# Patient Record
Sex: MALE | Race: WHITE | NOT HISPANIC OR LATINO | Employment: FULL TIME | URBAN - METROPOLITAN AREA
[De-identification: names, ages, dates, MRNs, and addresses within clinical notes are randomized per-mention and may not be internally consistent; named-entity substitution may affect disease eponyms.]

---

## 2017-03-31 ENCOUNTER — OFFICE VISIT (OUTPATIENT)
Dept: INTERNAL MEDICINE | Facility: CLINIC | Age: 27
End: 2017-03-31

## 2017-03-31 VITALS
HEART RATE: 71 BPM | DIASTOLIC BLOOD PRESSURE: 72 MMHG | SYSTOLIC BLOOD PRESSURE: 114 MMHG | TEMPERATURE: 99.3 F | HEIGHT: 72 IN | RESPIRATION RATE: 16 BRPM | BODY MASS INDEX: 25.41 KG/M2 | WEIGHT: 187.6 LBS

## 2017-03-31 DIAGNOSIS — R05.9 COUGH: ICD-10-CM

## 2017-03-31 DIAGNOSIS — R09.82 PND (POST-NASAL DRIP): ICD-10-CM

## 2017-03-31 DIAGNOSIS — J01.90 ACUTE SINUSITIS, RECURRENCE NOT SPECIFIED, UNSPECIFIED LOCATION: Primary | ICD-10-CM

## 2017-03-31 PROCEDURE — 99214 OFFICE O/P EST MOD 30 MIN: CPT | Performed by: NURSE PRACTITIONER

## 2017-03-31 RX ORDER — BROMPHENIRAMINE MALEATE, PSEUDOEPHEDRINE HYDROCHLORIDE, AND DEXTROMETHORPHAN HYDROBROMIDE 2; 30; 10 MG/5ML; MG/5ML; MG/5ML
10 SYRUP ORAL 4 TIMES DAILY PRN
Qty: 118 ML | Refills: 0 | Status: SHIPPED | OUTPATIENT
Start: 2017-03-31 | End: 2017-09-01

## 2017-03-31 RX ORDER — AMOXICILLIN AND CLAVULANATE POTASSIUM 875; 125 MG/1; MG/1
1 TABLET, FILM COATED ORAL 2 TIMES DAILY
Qty: 20 TABLET | Refills: 0 | Status: SHIPPED | OUTPATIENT
Start: 2017-03-31 | End: 2017-09-01

## 2017-03-31 NOTE — PROGRESS NOTES
Subjective   Ap Sotomayor is a 26 y.o. male    Chief Complaint   Patient presents with   • Nasal Congestion     congestion in chest, mucus is green and blood tinged.     URI    This is a new problem. The current episode started in the past 7 days (4 days). The problem has been waxing and waning. There has been no fever. Associated symptoms include congestion, coughing, headaches, rhinorrhea, sinus pain, a sore throat and wheezing. Pertinent negatives include no abdominal pain, chest pain, diarrhea, dysuria, ear pain, joint pain, joint swelling, nausea, neck pain, rash, sneezing, swollen glands or vomiting. Associated symptoms comments: Coughing up green mucous, cough keeps awake at night  . He has tried acetaminophen and NSAIDs (mucinex, dayquil, nyquil, ibuprofen) for the symptoms. The treatment provided no relief.        The following portions of the patient's history were reviewed and updated as appropriate: allergies, current medications, past family history, past medical history, past social history, past surgical history and problem list.  No current outpatient prescriptions on file.     Review of Systems   Constitutional: Negative for chills, fatigue and fever.   HENT: Positive for congestion, postnasal drip, rhinorrhea, sinus pressure and sore throat. Negative for ear pain, facial swelling and sneezing.    Respiratory: Positive for cough and wheezing. Negative for chest tightness and shortness of breath.    Cardiovascular: Negative for chest pain.   Gastrointestinal: Negative for abdominal pain, diarrhea, nausea and vomiting.   Endocrine: Negative for cold intolerance and heat intolerance.   Genitourinary: Negative for dysuria.   Musculoskeletal: Negative for arthralgias, joint pain and neck pain.   Skin: Negative for rash.   Neurological: Positive for headaches. Negative for dizziness.       Objective   Physical Exam   Constitutional: He is oriented to person, place, and time. He appears well-developed and  "well-nourished.   HENT:   Head: Normocephalic and atraumatic.   Nose: Mucosal edema and rhinorrhea present. Right sinus exhibits maxillary sinus tenderness and frontal sinus tenderness. Left sinus exhibits maxillary sinus tenderness and frontal sinus tenderness.   Mouth/Throat: Uvula is midline. Posterior oropharyngeal erythema present. No oropharyngeal exudate or posterior oropharyngeal edema.   Eyes: Conjunctivae and EOM are normal. Pupils are equal, round, and reactive to light.   Neck: Normal range of motion.   Cardiovascular: Normal rate, regular rhythm and normal heart sounds.    Pulmonary/Chest: Effort normal and breath sounds normal.   Abdominal: Soft. Bowel sounds are normal.   Musculoskeletal: Normal range of motion.   Neurological: He is alert and oriented to person, place, and time. He has normal reflexes.   Skin: Skin is warm and dry.   Psychiatric: He has a normal mood and affect. His behavior is normal. Judgment and thought content normal.     Vitals:    03/31/17 1144   BP: 114/72   Pulse: 71   Resp: 16   Temp: 99.3 °F (37.4 °C)   TempSrc: Temporal Artery    Weight: 187 lb 9.6 oz (85.1 kg)   Height: 71.9\" (182.6 cm)         Assessment/Plan   Ap was seen today for nasal congestion.    Diagnoses and all orders for this visit:    Acute sinusitis, recurrence not specified, unspecified location  -     amoxicillin-clavulanate (AUGMENTIN) 875-125 MG per tablet; Take 1 tablet by mouth 2 (Two) Times a Day.  -     brompheniramine-pseudoephedrine-DM 30-2-10 MG/5ML syrup; Take 10 mL by mouth 4 (Four) Times a Day As Needed for Congestion or Cough.    PND (post-nasal drip)  -     amoxicillin-clavulanate (AUGMENTIN) 875-125 MG per tablet; Take 1 tablet by mouth 2 (Two) Times a Day.  -     brompheniramine-pseudoephedrine-DM 30-2-10 MG/5ML syrup; Take 10 mL by mouth 4 (Four) Times a Day As Needed for Congestion or Cough.    Cough  -     amoxicillin-clavulanate (AUGMENTIN) 875-125 MG per tablet; Take 1 tablet by " mouth 2 (Two) Times a Day.  -     brompheniramine-pseudoephedrine-DM 30-2-10 MG/5ML syrup; Take 10 mL by mouth 4 (Four) Times a Day As Needed for Congestion or Cough.      Augmentin as directed  Bromfed as directed.  Tylenol/ibuprofen as needed  Increase fluids  RTC PRN or with worsening of sx's         Scribed for LINDSAY Bellamy by LINDSAY Patricio Student. 3/31/2017  12:11 PM    ISalima APRN, personally performed the services described in this documentation as scribed by the above named individual in my presence, and it is both accurate and complete.  3/31/2017  1:16 PM    LINDSAY Coronel

## 2017-09-01 ENCOUNTER — OFFICE VISIT (OUTPATIENT)
Dept: INTERNAL MEDICINE | Facility: CLINIC | Age: 27
End: 2017-09-01

## 2017-09-01 VITALS
HEART RATE: 82 BPM | OXYGEN SATURATION: 98 % | HEIGHT: 72 IN | TEMPERATURE: 98.1 F | BODY MASS INDEX: 24.95 KG/M2 | DIASTOLIC BLOOD PRESSURE: 78 MMHG | SYSTOLIC BLOOD PRESSURE: 110 MMHG | WEIGHT: 184.2 LBS

## 2017-09-01 DIAGNOSIS — H69.83 EUSTACHIAN TUBE DYSFUNCTION, BILATERAL: ICD-10-CM

## 2017-09-01 DIAGNOSIS — J01.90 ACUTE SINUSITIS, RECURRENCE NOT SPECIFIED, UNSPECIFIED LOCATION: Primary | ICD-10-CM

## 2017-09-01 PROCEDURE — 99213 OFFICE O/P EST LOW 20 MIN: CPT | Performed by: NURSE PRACTITIONER

## 2017-09-01 RX ORDER — FLUTICASONE PROPIONATE 50 MCG
2 SPRAY, SUSPENSION (ML) NASAL DAILY
Qty: 1 BOTTLE | Refills: 0 | Status: SHIPPED | OUTPATIENT
Start: 2017-09-01 | End: 2018-07-19

## 2017-09-01 RX ORDER — AMOXICILLIN AND CLAVULANATE POTASSIUM 875; 125 MG/1; MG/1
1 TABLET, FILM COATED ORAL 2 TIMES DAILY
Qty: 14 TABLET | Refills: 0 | Status: SHIPPED | OUTPATIENT
Start: 2017-09-01 | End: 2018-07-19

## 2017-09-01 RX ORDER — BROMPHENIRAMINE MALEATE, PSEUDOEPHEDRINE HYDROCHLORIDE, AND DEXTROMETHORPHAN HYDROBROMIDE 2; 30; 10 MG/5ML; MG/5ML; MG/5ML
10 SYRUP ORAL 4 TIMES DAILY PRN
Qty: 120 ML | Refills: 0 | Status: SHIPPED | OUTPATIENT
Start: 2017-09-01 | End: 2018-07-19

## 2017-09-01 NOTE — PROGRESS NOTES
Subjective   Ap Sotomayor is a 27 y.o. male.   Chief Complaint   Patient presents with   • Nasal Congestion     nasal congestion with bloody mucous and  cough up yellow/clear mucous.  3 days.  Tried mucous relief and cough drops   • Cough       Sinusitis   This is a new problem. The current episode started in the past 7 days. The problem has been gradually worsening since onset. There has been no fever. The pain is mild. Associated symptoms include congestion (bloody mucous ), coughing (productive clear yellow) and sinus pressure. Pertinent negatives include no chills, diaphoresis, ear pain, headaches, hoarse voice, neck pain, shortness of breath, sneezing, sore throat or swollen glands. Treatments tried: mucinex and cough drops. The treatment provided mild relief.           The following portions of the patient's history were reviewed and updated as appropriate: allergies, current medications, past family history, past medical history, past social history, past surgical history and problem list.    Review of Systems   Constitutional: Negative for appetite change, chills, diaphoresis, fatigue and fever.   HENT: Positive for congestion (bloody mucous ), postnasal drip and sinus pressure. Negative for dental problem, ear pain, hoarse voice, rhinorrhea, sneezing, sore throat and trouble swallowing.    Eyes: Negative for pain and itching.   Respiratory: Positive for cough (productive clear yellow). Negative for shortness of breath and wheezing.    Cardiovascular: Negative for chest pain and leg swelling.   Gastrointestinal: Negative for abdominal pain, nausea and vomiting.   Genitourinary: Negative for difficulty urinating.   Musculoskeletal: Negative for neck pain.   Skin: Negative for rash.   Neurological: Negative for headaches.   Psychiatric/Behavioral: Negative for sleep disturbance.       Objective   Physical Exam   Constitutional: He appears well-developed and well-nourished. He does not have a sickly appearance.  No distress.   HENT:   Head: Normocephalic and atraumatic.   Right Ear: External ear normal. Tympanic membrane is not injected, not erythematous, not retracted and not bulging. Tympanic membrane mobility is normal. A middle ear effusion (small serous) is present.   Left Ear: External ear normal. Tympanic membrane is not injected, not erythematous, not retracted and not bulging. Tympanic membrane mobility is normal. A middle ear effusion (mild serous) is present.   Nose: Mucosal edema and rhinorrhea present. Right sinus exhibits maxillary sinus tenderness and frontal sinus tenderness. Left sinus exhibits maxillary sinus tenderness and frontal sinus tenderness.   Mouth/Throat: Uvula is midline, oropharynx is clear and moist and mucous membranes are normal.   Eyes: Conjunctivae are normal.   Cardiovascular: Normal rate, regular rhythm and normal heart sounds.    Pulmonary/Chest: Effort normal and breath sounds normal.   Abdominal: Soft.   Neurological: He is alert.   Skin: Skin is warm and dry. He is not diaphoretic. No erythema.   Nursing note and vitals reviewed.      Assessment/Plan      Ap was seen today for nasal congestion and cough.    Diagnoses and all orders for this visit:    Acute sinusitis, recurrence not specified, unspecified location  -     amoxicillin-clavulanate (AUGMENTIN) 875-125 MG per tablet; Take 1 tablet by mouth 2 (Two) Times a Day.  -     brompheniramine-pseudoephedrine-DM 30-2-10 MG/5ML syrup; Take 10 mL by mouth 4 (Four) Times a Day As Needed for Allergies.    Eustachian tube dysfunction, bilateral  -     fluticasone (FLONASE) 50 MCG/ACT nasal spray; 2 sprays into each nostril Daily.      Meds as directed  Increase fluids  Tylenol and ibuprofen OTC PRN pain/fever  RTC PRN or with worsening of sx's  Plan of care discussed with pt. He verbalized understanding and agreement.

## 2018-07-19 ENCOUNTER — OFFICE VISIT (OUTPATIENT)
Dept: INTERNAL MEDICINE | Facility: CLINIC | Age: 28
End: 2018-07-19

## 2018-07-19 VITALS
SYSTOLIC BLOOD PRESSURE: 110 MMHG | HEART RATE: 92 BPM | TEMPERATURE: 97.8 F | WEIGHT: 175.8 LBS | BODY MASS INDEX: 23.81 KG/M2 | OXYGEN SATURATION: 97 % | HEIGHT: 72 IN | DIASTOLIC BLOOD PRESSURE: 82 MMHG

## 2018-07-19 DIAGNOSIS — R19.7 DIARRHEA, UNSPECIFIED TYPE: ICD-10-CM

## 2018-07-19 DIAGNOSIS — K52.9 GASTROENTERITIS: ICD-10-CM

## 2018-07-19 DIAGNOSIS — Z91.09 ENVIRONMENTAL ALLERGIES: ICD-10-CM

## 2018-07-19 DIAGNOSIS — J02.9 SORE THROAT: Primary | ICD-10-CM

## 2018-07-19 DIAGNOSIS — R11.0 NAUSEA: ICD-10-CM

## 2018-07-19 LAB
EXPIRATION DATE: NORMAL
INTERNAL CONTROL: NORMAL
Lab: NORMAL
S PYO AG THROAT QL: NEGATIVE

## 2018-07-19 PROCEDURE — 87880 STREP A ASSAY W/OPTIC: CPT | Performed by: NURSE PRACTITIONER

## 2018-07-19 PROCEDURE — 99214 OFFICE O/P EST MOD 30 MIN: CPT | Performed by: NURSE PRACTITIONER

## 2018-07-19 RX ORDER — ONDANSETRON 4 MG/1
4 TABLET, ORALLY DISINTEGRATING ORAL EVERY 8 HOURS PRN
Qty: 20 TABLET | Refills: 0 | Status: SHIPPED | OUTPATIENT
Start: 2018-07-19 | End: 2019-11-01

## 2018-07-19 NOTE — PROGRESS NOTES
Subjective   Ap Sotomayor is a 28 y.o. male.   Chief Complaint   Patient presents with   • Nausea/diarrhea     feverish, body aches, nausea, diarrhea, sore throat, headache.  Symptoms started yesterday. Little better today.  Taken cold relief, nyquil and alkaseltzer cold       Nausea   This is a new problem. The current episode started yesterday. The problem occurs intermittently. The problem has been gradually improving. Associated symptoms include abdominal pain (resolved), headaches, myalgias, nausea and a sore throat. Pertinent negatives include no anorexia, arthralgias, change in bowel habit, chest pain, chills, congestion, coughing, diaphoresis, fatigue, fever (felt feverish, but no fever), neck pain, numbness, rash, swollen glands, urinary symptoms, vertigo, visual change, vomiting or weakness. Nothing aggravates the symptoms. He has tried nothing for the symptoms. The treatment provided no relief.   Diarrhea    This is a new problem. The current episode started yesterday. The problem occurs 2 to 4 times per day. The problem has been gradually improving. The stool consistency is described as watery. Associated symptoms include abdominal pain (resolved), headaches and myalgias. Pertinent negatives include no arthralgias, bloating, chills, coughing, fever (felt feverish, but no fever), sweats, URI, vomiting or weight loss. Nothing aggravates the symptoms. There are no known risk factors. He has tried nothing for the symptoms. The treatment provided no relief. There is no history of irritable bowel syndrome or a recent abdominal surgery.   Sore Throat    This is a new problem. The current episode started yesterday. The problem has been rapidly improving. Neither side of throat is experiencing more pain than the other. There has been no fever. The pain is mild. Associated symptoms include abdominal pain (resolved), diarrhea and headaches. Pertinent negatives include no congestion, coughing, ear pain, neck pain,  shortness of breath, stridor, swollen glands, trouble swallowing or vomiting. He has had no exposure to strep or mono. Treatments tried: OTC meds. The treatment provided moderate relief.        The following portions of the patient's history were reviewed and updated as appropriate: allergies, current medications, past family history, past medical history, past social history, past surgical history and problem list.    Review of Systems   Constitutional: Negative for chills, diaphoresis, fatigue, fever (felt feverish, but no fever) and weight loss.   HENT: Positive for rhinorrhea and sore throat. Negative for congestion, ear pain, postnasal drip, sinus pain, sinus pressure, sneezing, trouble swallowing and voice change.    Respiratory: Negative for cough, shortness of breath and stridor.    Cardiovascular: Negative for chest pain.   Gastrointestinal: Positive for abdominal pain (resolved), diarrhea and nausea. Negative for anorexia, bloating, change in bowel habit and vomiting.   Musculoskeletal: Positive for myalgias. Negative for arthralgias and neck pain.   Skin: Negative for rash.   Allergic/Immunologic: Positive for environmental allergies.   Neurological: Positive for headaches. Negative for vertigo, weakness and numbness.       Objective   Physical Exam   Constitutional: He is oriented to person, place, and time. He appears well-developed and well-nourished. No distress.   HENT:   Head: Normocephalic and atraumatic.   Right Ear: External ear normal.   Left Ear: External ear normal.   Nose: Nose normal.   Mouth/Throat: Oropharynx is clear and moist. No oropharyngeal exudate.   Eyes: Conjunctivae are normal. Right eye exhibits no discharge. Left eye exhibits no discharge. No scleral icterus.   Neck: Normal range of motion. Neck supple.   Cardiovascular: Normal rate, regular rhythm and normal heart sounds.    Pulmonary/Chest: Effort normal and breath sounds normal. No respiratory distress.   Abdominal: Soft.  Bowel sounds are normal. He exhibits no distension and no mass. There is no tenderness. There is no rebound and no guarding. No hernia.   Lymphadenopathy:     He has no cervical adenopathy.   Neurological: He is alert and oriented to person, place, and time.   Skin: Skin is warm and dry. No rash noted. He is not diaphoretic. No erythema. No pallor.   Psychiatric: He has a normal mood and affect. His behavior is normal.   Nursing note and vitals reviewed.      Assessment/Plan      Ap was seen today for nausea/diarrhea.    Diagnoses and all orders for this visit:    Sore throat  -     POCT rapid strep A    Diarrhea, unspecified type    Nausea  -     ondansetron ODT (ZOFRAN ODT) 4 MG disintegrating tablet; Take 1 tablet by mouth Every 8 (Eight) Hours As Needed for Nausea or Vomiting.    Gastroenteritis    Environmental allergies        Strep negative.   zofran as needed for nausea.   Clear liquids X 24h and then AAT  OTC claritin  Tylenol or ibuprofen as needed  Return if symptoms worsen or fail to improve. over the next 24-48 hours  Plan of care discussed with pt. They verbalized understanding and agreement.

## 2019-03-06 ENCOUNTER — TELEPHONE (OUTPATIENT)
Dept: INTERNAL MEDICINE | Facility: CLINIC | Age: 29
End: 2019-03-06

## 2019-03-06 RX ORDER — CIPROFLOXACIN HYDROCHLORIDE 3.5 MG/ML
SOLUTION/ DROPS TOPICAL
Qty: 1 EACH | Refills: 0 | Status: SHIPPED | OUTPATIENT
Start: 2019-03-06 | End: 2019-11-01

## 2019-03-06 NOTE — TELEPHONE ENCOUNTER
Yes I will send in drops. If no better over next few days, would get seen.  OK to give work excuse for today

## 2019-03-06 NOTE — TELEPHONE ENCOUNTER
He woke up with his left eye red and gooey.  He has drainage in his left eye.  He wants to know if you can send him in an eye drop.  He also needs a letter for work today.  He cannot go because of his eye.

## 2019-11-01 ENCOUNTER — OFFICE VISIT (OUTPATIENT)
Dept: INTERNAL MEDICINE | Facility: CLINIC | Age: 29
End: 2019-11-01

## 2019-11-01 VITALS
RESPIRATION RATE: 16 BRPM | HEART RATE: 99 BPM | BODY MASS INDEX: 25.98 KG/M2 | DIASTOLIC BLOOD PRESSURE: 60 MMHG | HEIGHT: 72 IN | SYSTOLIC BLOOD PRESSURE: 100 MMHG | OXYGEN SATURATION: 98 % | TEMPERATURE: 98.6 F | WEIGHT: 191.8 LBS

## 2019-11-01 DIAGNOSIS — J06.9 VIRAL UPPER RESPIRATORY TRACT INFECTION: Primary | ICD-10-CM

## 2019-11-01 DIAGNOSIS — R68.89 FLU-LIKE SYMPTOMS: ICD-10-CM

## 2019-11-01 LAB
EXPIRATION DATE: NORMAL
FLUAV AG NPH QL: NEGATIVE
FLUBV AG NPH QL: NEGATIVE
INTERNAL CONTROL: NORMAL
Lab: NORMAL

## 2019-11-01 PROCEDURE — 87804 INFLUENZA ASSAY W/OPTIC: CPT | Performed by: NURSE PRACTITIONER

## 2019-11-01 PROCEDURE — 99214 OFFICE O/P EST MOD 30 MIN: CPT | Performed by: NURSE PRACTITIONER

## 2019-11-01 RX ORDER — BROMPHENIRAMINE MALEATE, PSEUDOEPHEDRINE HYDROCHLORIDE, AND DEXTROMETHORPHAN HYDROBROMIDE 2; 30; 10 MG/5ML; MG/5ML; MG/5ML
10 SYRUP ORAL 4 TIMES DAILY PRN
Qty: 300 ML | Refills: 0 | Status: SHIPPED | OUTPATIENT
Start: 2019-11-01 | End: 2020-12-10

## 2019-11-01 NOTE — PROGRESS NOTES
Subjective   Ap Sotomayor is a 29 y.o. male    Chief Complaint   Patient presents with   • URI     sx' started yesterday with headache and nausea. No he has also developed chest congestion, slight cough, fatigue, body aches and chills. Has been taking OTC Cold and flu meds.      URI    This is a new problem. The current episode started yesterday. The problem has been unchanged. There has been no fever (but has felt chilled). Associated symptoms include congestion, coughing, headaches, joint pain (bodyaches), nausea, a plugged ear sensation and a sore throat. Pertinent negatives include no abdominal pain, chest pain, diarrhea, dysuria, ear pain, joint swelling, neck pain, rash, rhinorrhea, sinus pain, sneezing, swollen glands, vomiting or wheezing. He has tried acetaminophen and NSAIDs for the symptoms. The treatment provided no relief.        The following portions of the patient's history were reviewed and updated as appropriate: allergies, current medications, past family history, past medical history, past social history, past surgical history and problem list.    Current Outpatient Medications:   •  brompheniramine-pseudoephedrine-DM 30-2-10 MG/5ML syrup, Take 10 mL by mouth 4 (Four) Times a Day As Needed for Congestion., Disp: 300 mL, Rfl: 0     Review of Systems   Constitutional: Negative for chills, fatigue and fever.   HENT: Positive for congestion and sore throat. Negative for ear pain, rhinorrhea, sinus pain and sneezing.    Respiratory: Positive for cough. Negative for chest tightness, shortness of breath and wheezing.    Cardiovascular: Negative for chest pain.   Gastrointestinal: Positive for nausea. Negative for abdominal pain, diarrhea and vomiting.   Endocrine: Negative for cold intolerance and heat intolerance.   Genitourinary: Negative for dysuria.   Musculoskeletal: Positive for joint pain (bodyaches). Negative for arthralgias and neck pain.   Skin: Negative for rash.   Neurological: Positive for  "headaches. Negative for dizziness.       Objective   Physical Exam   Constitutional: He is oriented to person, place, and time. He appears well-developed and well-nourished.   HENT:   Head: Normocephalic and atraumatic.   Right Ear: A middle ear effusion is present.   Left Ear: A middle ear effusion is present.   Nose: Right sinus exhibits no maxillary sinus tenderness and no frontal sinus tenderness. Left sinus exhibits no maxillary sinus tenderness and no frontal sinus tenderness.   Mouth/Throat: Posterior oropharyngeal erythema present.   Eyes: Conjunctivae and EOM are normal. Pupils are equal, round, and reactive to light.   Neck: Normal range of motion.   Cardiovascular: Normal rate, regular rhythm and normal heart sounds.   Pulmonary/Chest: Effort normal and breath sounds normal.   Abdominal: Soft. Bowel sounds are normal.   Musculoskeletal: Normal range of motion.   Neurological: He is alert and oriented to person, place, and time. He has normal reflexes.   Skin: Skin is warm and dry.   Psychiatric: He has a normal mood and affect. His behavior is normal. Judgment and thought content normal.     Vitals:    11/01/19 1025   BP: 100/60   Pulse: 99   Resp: 16   Temp: 98.6 °F (37 °C)   TempSrc: Temporal   SpO2: 98%   Weight: 87 kg (191 lb 12.8 oz)   Height: 182.6 cm (71.89\")         Assessment/Plan   Ap was seen today for uri.    Diagnoses and all orders for this visit:    Viral upper respiratory tract infection  -     brompheniramine-pseudoephedrine-DM 30-2-10 MG/5ML syrup; Take 10 mL by mouth 4 (Four) Times a Day As Needed for Congestion.    Flu-like symptoms  -     POC Influenza A / B  -     brompheniramine-pseudoephedrine-DM 30-2-10 MG/5ML syrup; Take 10 mL by mouth 4 (Four) Times a Day As Needed for Congestion.      Rapid flu test was negative  Sx's appear to be viral   Symptomatic tx  Bromfed PRN  Increase fluids  May use tylenol and/or ibuprofen PRN  RTC if sx's worsen or do not improve           "

## 2020-02-20 ENCOUNTER — TELEPHONE (OUTPATIENT)
Dept: INTERNAL MEDICINE | Facility: CLINIC | Age: 30
End: 2020-02-20

## 2020-02-20 NOTE — TELEPHONE ENCOUNTER
Patient stated he has a swollen lower eyelid on his left eye, this was first noticed 2/14/20. Patient stated he has been using warm compresses since he noticed it. Patient stated it has not gotten any better. Patient is requesting a medication be sent in for this issue if possible.     [facility] on [address] [facility] in [place] confirmed     Please call and advise. Patient call back [phone]

## 2020-02-20 NOTE — TELEPHONE ENCOUNTER
Yes would need to be seen. If we don't have openings here, would go to [facility]

## 2020-12-10 ENCOUNTER — OFFICE VISIT (OUTPATIENT)
Dept: INTERNAL MEDICINE | Facility: CLINIC | Age: 30
End: 2020-12-10

## 2020-12-10 VITALS
HEIGHT: 72 IN | WEIGHT: 195.2 LBS | BODY MASS INDEX: 26.44 KG/M2 | TEMPERATURE: 98.6 F | HEART RATE: 74 BPM | DIASTOLIC BLOOD PRESSURE: 70 MMHG | OXYGEN SATURATION: 99 % | SYSTOLIC BLOOD PRESSURE: 118 MMHG

## 2020-12-10 DIAGNOSIS — K42.9 UMBILICAL HERNIA WITHOUT OBSTRUCTION AND WITHOUT GANGRENE: Primary | ICD-10-CM

## 2020-12-10 PROCEDURE — 99213 OFFICE O/P EST LOW 20 MIN: CPT | Performed by: INTERNAL MEDICINE

## 2020-12-10 NOTE — PROGRESS NOTES
"Subjective   Ap Sotomayor is a 30 y.o. male.     History of Present Illness     Has an umbilical hernia for at least 8 years.  He did see surgeon previously but at that time decided not to have anything done but is noticing more tenderness over the area and would like to go ahead and have it repaired.  No problems with bowel movements generally.  No severe pain but is tender to the touch    Current Outpatient Medications on File Prior to Visit   Medication Sig Dispense Refill   • brompheniramine-pseudoephedrine-DM 30-2-10 MG/5ML syrup Take 10 mL by mouth 4 (Four) Times a Day As Needed for Congestion. 300 mL 0     No current facility-administered medications on file prior to visit.        The following portions of the patient's history were reviewed and updated as appropriate: allergies, current medications, past family history, past medical history, past social history, past surgical history and problem list.    Review of Systems   Constitutional: Positive for unexpected weight gain. Negative for activity change, appetite change, fever and unexpected weight loss.   Gastrointestinal: Positive for abdominal pain. Negative for constipation and diarrhea.   Allergic/Immunologic: Negative for immunocompromised state.       Objective   /70 (BP Location: Left arm, Patient Position: Sitting)   Pulse 74   Temp 98.6 °F (37 °C) (Infrared)   Ht 182.6 cm (71.9\")   Wt 88.5 kg (195 lb 3.2 oz)   SpO2 99%   BMI 26.55 kg/m²   Physical Exam  Constitutional:       General: He is not in acute distress.     Appearance: Normal appearance. He is well-developed. He is not diaphoretic.   HENT:      Head: Normocephalic and atraumatic.   Eyes:      Conjunctiva/sclera: Conjunctivae normal.   Cardiovascular:      Rate and Rhythm: Normal rate and regular rhythm.      Heart sounds: Normal heart sounds. No murmur. No friction rub. No gallop.    Pulmonary:      Effort: Pulmonary effort is normal. No respiratory distress.      Breath " sounds: Normal breath sounds. No wheezing.   Abdominal:      General: Abdomen is flat. Bowel sounds are normal.      Palpations: Abdomen is soft.      Tenderness: There is abdominal tenderness (over umbilicus).      Hernia: A hernia (umbilical) is present.   Skin:     General: Skin is warm and dry.   Neurological:      Mental Status: He is alert and oriented to person, place, and time.   Psychiatric:         Behavior: Behavior normal.         Thought Content: Thought content normal.         Judgment: Judgment normal.           Assessment/Plan   Diagnoses and all orders for this visit:    1. Umbilical hernia without obstruction and without gangrene (Primary)  -     Ambulatory Referral to General Surgery

## 2021-01-15 ENCOUNTER — APPOINTMENT (OUTPATIENT)
Dept: PREADMISSION TESTING | Facility: HOSPITAL | Age: 31
End: 2021-01-15

## 2021-01-15 PROCEDURE — U0004 COV-19 TEST NON-CDC HGH THRU: HCPCS

## 2021-01-15 PROCEDURE — C9803 HOPD COVID-19 SPEC COLLECT: HCPCS

## 2021-01-16 LAB — SARS-COV-2 RNA RESP QL NAA+PROBE: NOT DETECTED

## 2021-01-18 ENCOUNTER — LAB REQUISITION (OUTPATIENT)
Dept: LAB | Facility: HOSPITAL | Age: 31
End: 2021-01-18

## 2021-01-18 DIAGNOSIS — K42.9 UMBILICAL HERNIA WITHOUT OBSTRUCTION OR GANGRENE: ICD-10-CM

## 2021-01-18 PROCEDURE — 88302 TISSUE EXAM BY PATHOLOGIST: CPT | Performed by: SURGERY

## 2021-01-19 LAB
CYTO UR: NORMAL
LAB AP CASE REPORT: NORMAL
LAB AP CLINICAL INFORMATION: NORMAL
PATH REPORT.FINAL DX SPEC: NORMAL
PATH REPORT.GROSS SPEC: NORMAL

## 2021-04-15 ENCOUNTER — OFFICE VISIT (OUTPATIENT)
Dept: INTERNAL MEDICINE | Facility: CLINIC | Age: 31
End: 2021-04-15

## 2021-04-15 VITALS
HEART RATE: 65 BPM | DIASTOLIC BLOOD PRESSURE: 78 MMHG | HEIGHT: 72 IN | TEMPERATURE: 97.4 F | BODY MASS INDEX: 25.73 KG/M2 | WEIGHT: 190 LBS | RESPIRATION RATE: 20 BRPM | SYSTOLIC BLOOD PRESSURE: 120 MMHG | OXYGEN SATURATION: 99 %

## 2021-04-15 DIAGNOSIS — J02.9 SORE THROAT: ICD-10-CM

## 2021-04-15 DIAGNOSIS — K12.0 CANKER SORE: Primary | ICD-10-CM

## 2021-04-15 DIAGNOSIS — J30.9 ALLERGIC RHINITIS, UNSPECIFIED SEASONALITY, UNSPECIFIED TRIGGER: ICD-10-CM

## 2021-04-15 LAB
EXPIRATION DATE: NORMAL
INTERNAL CONTROL: NORMAL
Lab: NORMAL
S PYO AG THROAT QL: NEGATIVE

## 2021-04-15 PROCEDURE — 99213 OFFICE O/P EST LOW 20 MIN: CPT | Performed by: PHYSICIAN ASSISTANT

## 2021-04-15 PROCEDURE — 87880 STREP A ASSAY W/OPTIC: CPT | Performed by: PHYSICIAN ASSISTANT

## 2021-04-15 RX ORDER — LORATADINE 10 MG/1
10 TABLET ORAL DAILY
COMMUNITY
End: 2022-12-28

## 2021-04-15 NOTE — PROGRESS NOTES
Chief Complaint  Sore Throat, Fever, and Earache    Subjective          History of Present Illness  Ap Sotomayor presents to [facility] for   Sore throat:  Has had a sore throat now for the last 3 or 4 days.  Symptoms are persisting and so he wants to make sure he does not have strep.  He is around his nieces and nephews and although they have not been sick they could be carrying strep.  He has not had a fever.  Has not had a cough, headache, sore throat, nausea, vomiting, diarrhea.  Mostly just has a sore throat that is worse on the right and an earache that is worse on the right.  Not feel like he has much runny or stuffy nose.      Review of Systems   Constitutional: Negative for fever and unexpected weight loss.   HENT: Positive for sore throat. Negative for congestion, ear pain, postnasal drip, sinus pressure and swollen glands.    Respiratory: Negative for cough, shortness of breath and wheezing.    Cardiovascular: Negative for chest pain and palpitations.   Gastrointestinal: Negative for constipation, diarrhea, nausea and vomiting.   Musculoskeletal: Negative for myalgias.   Neurological: Negative for headache.       The following portions of the patient's history were reviewed and updated as appropriate: allergies, current medications, past family history, past medical history, past social history, past surgical history and problem list.  No Known Allergies  Current Outpatient Medications on File Prior to Visit   Medication Sig Dispense Refill   • loratadine (CLARITIN) 10 MG tablet Take 10 mg by mouth Daily.     • [DISCONTINUED] oxyCODONE-acetaminophen (PERCOCET) 5-325 MG per tablet Take 1 tablet by mouth Every 6 (Six) Hours As Needed. 12 tablet 0     No current facility-administered medications on file prior to visit.     No orders of the defined types were placed in this encounter.      Social History     Tobacco Use   Smoking Status Never Smoker   Smokeless Tobacco  "Never Used        Objective   Vital Signs:   Vitals:    04/15/21 1509 04/15/21 1519   BP: 120/78    Pulse: 65    Resp: 20    Temp: 100 °F (37.8 °C) 97.4 °F (36.3 °C)   TempSrc: Temporal Temporal   SpO2: 99%    Weight: 86.2 kg (190 lb)    Height: 182.6 cm (71.9\")    PainSc:   4    PainLoc: Throat  Comment: sore throat and ear ache       Physical Exam  Vitals reviewed.   Constitutional:       General: He is not in acute distress.     Appearance: Normal appearance.   HENT:      Head: Normocephalic and atraumatic.      Right Ear: Tympanic membrane, ear canal and external ear normal.      Left Ear: Tympanic membrane, ear canal and external ear normal.      Nose: Nose normal.      Mouth/Throat:      Mouth: Mucous membranes are moist.      Pharynx: Posterior oropharyngeal erythema (4 mm aphthous ulcer on right side of soft palate.) present. No oropharyngeal exudate.   Eyes:      General: No scleral icterus.     Extraocular Movements: Extraocular movements intact.      Conjunctiva/sclera: Conjunctivae normal.   Cardiovascular:      Rate and Rhythm: Normal rate and regular rhythm.      Heart sounds: Normal heart sounds. No murmur heard.     Pulmonary:      Effort: Pulmonary effort is normal. No respiratory distress.      Breath sounds: Normal breath sounds. No stridor. No wheezing or rhonchi.   Musculoskeletal:      Cervical back: Normal range of motion and neck supple.   Skin:     General: Skin is warm and dry.      Coloration: Skin is not jaundiced.   Neurological:      General: No focal deficit present.      Mental Status: He is alert and oriented to person, place, and time.      Gait: Gait normal.   Psychiatric:         Mood and Affect: Mood normal.         Behavior: Behavior normal.        Results for orders placed or performed in visit on 04/15/21   POC Rapid Strep A    Specimen: Swab   Result Value Ref Range    Rapid Strep A Screen Negative Negative, VALID, INVALID, Not Performed    Internal Control Passed Passed    " Lot Number [id number]     Expiration Date 06/30/2021        Result Review :                   Assessment and Plan    Diagnoses and all orders for this visit:    1. Canker sore (Primary)  Assessment & Plan:  Likely cause of sore throat and referred ear pain.  Strep is negative.  Can use Chloraseptic spray as needed for pain.  Follow-up if symptoms worsen or has new lesions in throat.       2. Sore throat  -     POC Rapid Strep A    3. Allergic rhinitis, unspecified seasonality, unspecified trigger  Assessment & Plan:  Continue Claritin          Follow Up   Return for Next scheduled follow up.    Follow up if symptoms worsen or persist or has new or concerning symptoms, go to ER for severe symptoms.   Reviewed common medication effects and side effects and advised to report side effects immediately, the patient expressed good understanding.  Encouraged medication compliance and the importance of keeping scheduled follow up appointments with me and any other providers  If labs or images are ordered we will contact you with the results within the next week.  If you have not heard from us after a week please call our office to inquire about the results.   Patient was given instructions and counseling regarding his condition or for health maintenance advice. Please see specific information pulled into the AVS if appropriate.     Janette Hampton PA-C    * Please note that portions of this note may have been completed with a voice recognition program. Efforts were made to edit the dictation but occasionally words are erroneously transcribed.

## 2021-04-16 NOTE — ASSESSMENT & PLAN NOTE
Likely cause of sore throat and referred ear pain.  Strep is negative.  Can use Chloraseptic spray as needed for pain.  Follow-up if symptoms worsen or has new lesions in throat.

## 2021-07-30 ENCOUNTER — OFFICE VISIT (OUTPATIENT)
Dept: INTERNAL MEDICINE | Facility: CLINIC | Age: 31
End: 2021-07-30

## 2021-07-30 VITALS
WEIGHT: 188 LBS | OXYGEN SATURATION: 98 % | HEIGHT: 72 IN | RESPIRATION RATE: 20 BRPM | TEMPERATURE: 98 F | BODY MASS INDEX: 25.47 KG/M2 | HEART RATE: 78 BPM

## 2021-07-30 DIAGNOSIS — L25.5 CONTACT DERMATITIS DUE TO PLANT: Primary | ICD-10-CM

## 2021-07-30 PROCEDURE — 96372 THER/PROPH/DIAG INJ SC/IM: CPT | Performed by: PHYSICIAN ASSISTANT

## 2021-07-30 PROCEDURE — 99213 OFFICE O/P EST LOW 20 MIN: CPT | Performed by: PHYSICIAN ASSISTANT

## 2021-07-30 RX ORDER — PREDNISONE 10 MG/1
10 TABLET ORAL DAILY
Qty: 12 TABLET | Refills: 0 | Status: SHIPPED | OUTPATIENT
Start: 2021-07-30 | End: 2022-05-02

## 2021-07-30 RX ORDER — TRIAMCINOLONE ACETONIDE 0.25 MG/G
CREAM TOPICAL 2 TIMES DAILY
Qty: 15 G | Refills: 0 | Status: SHIPPED | OUTPATIENT
Start: 2021-07-30 | End: 2022-12-28

## 2021-07-30 RX ORDER — METHYLPREDNISOLONE ACETATE 40 MG/ML
40 INJECTION, SUSPENSION INTRA-ARTICULAR; INTRALESIONAL; INTRAMUSCULAR; SOFT TISSUE ONCE
Status: COMPLETED | OUTPATIENT
Start: 2021-07-30 | End: 2021-07-30

## 2021-07-30 RX ADMIN — METHYLPREDNISOLONE ACETATE 40 MG: 40 INJECTION, SUSPENSION INTRA-ARTICULAR; INTRALESIONAL; INTRAMUSCULAR; SOFT TISSUE at 10:30

## 2021-07-30 NOTE — ASSESSMENT & PLAN NOTE
Steroid shot in office as requested. Will rx steroid taper over the next 6 days to start tomorrow. F/u if sx worsen or persist, or cont to have new spots.

## 2021-07-30 NOTE — PROGRESS NOTES
"Chief Complaint  Poison Ivy    Subjective          History of Present Illness  Ap Sotomayor presents to [facility] for   Poison Ivy:  Did yard work on Sunday and then noticed a rash on his arm/legs a day or two after. He has had new spots popping up and yesterday started getting the rash in his groin. He knows it is not an STD. The rash is itchy and blistery. Is req a steroid shot. No other sx, no wheeze.       Review of Systems   Constitutional: Negative for fever and unexpected weight loss.   Respiratory: Negative for cough, shortness of breath and wheezing.    Cardiovascular: Negative for chest pain and palpitations.   Skin: Positive for rash.       The following portions of the patient's history were reviewed and updated as appropriate: allergies, current medications, past family history, past medical history, past social history, past surgical history and problem list.  No Known Allergies  Current Outpatient Medications on File Prior to Visit   Medication Sig Dispense Refill   • loratadine (CLARITIN) 10 MG tablet Take 10 mg by mouth Daily.       No current facility-administered medications on file prior to visit.     New Medications Ordered This Visit   Medications   • triamcinolone (KENALOG) 0.025 % cream     Sig: Apply  topically to the appropriate area as directed 2 (Two) Times a Day.     Dispense:  15 g     Refill:  0   • predniSONE (DELTASONE) 10 MG tablet     Sig: Take 1 tablet by mouth Daily. 30mg x 2d, 20mg x 2d, 10mg x 2d     Dispense:  12 tablet     Refill:  0   • methylPREDNISolone acetate (DEPO-medrol) injection 40 mg       Social History     Tobacco Use   Smoking Status Never Smoker   Smokeless Tobacco Never Used        Objective   Vital Signs:   Vitals:    07/30/21 0948   BP: Comment: WEI - pt has poison ivy   Pulse: 78   Resp: 20   Temp: 98 °F (36.7 °C)   TempSrc: Temporal   SpO2: 98%   Weight: 85.3 kg (188 lb)   Height: 182.6 cm (71.9\")   PainSc: 0-No pain "      Physical Exam  Vitals reviewed.   Constitutional:       General: He is not in acute distress.     Appearance: Normal appearance.   HENT:      Head: Normocephalic and atraumatic.   Eyes:      General: No scleral icterus.     Extraocular Movements: Extraocular movements intact.      Conjunctiva/sclera: Conjunctivae normal.   Cardiovascular:      Rate and Rhythm: Normal rate and regular rhythm.      Heart sounds: Normal heart sounds. No murmur heard.     Pulmonary:      Effort: Pulmonary effort is normal. No respiratory distress.      Breath sounds: Normal breath sounds. No stridor. No wheezing or rhonchi.   Musculoskeletal:      Cervical back: Normal range of motion and neck supple.   Skin:     General: Skin is warm and dry.      Coloration: Skin is not jaundiced.      Findings: Rash present.      Comments: eryth vesicular rash on right arm, lower legs bilat, moderate in severity.   Neurological:      General: No focal deficit present.      Mental Status: He is alert and oriented to person, place, and time.      Gait: Gait normal.   Psychiatric:         Mood and Affect: Mood normal.         Behavior: Behavior normal.          Result Review :                   Assessment and Plan    Diagnoses and all orders for this visit:    1. Contact dermatitis due to plant (Primary)  Assessment & Plan:  Steroid shot in office as requested. Will rx steroid taper over the next 6 days to start tomorrow. F/u if sx worsen or persist, or cont to have new spots.     Orders:  -     triamcinolone (KENALOG) 0.025 % cream; Apply  topically to the appropriate area as directed 2 (Two) Times a Day.  Dispense: 15 g; Refill: 0  -     predniSONE (DELTASONE) 10 MG tablet; Take 1 tablet by mouth Daily. 30mg x 2d, 20mg x 2d, 10mg x 2d  Dispense: 12 tablet; Refill: 0  -     methylPREDNISolone acetate (DEPO-medrol) injection 40 mg        Follow Up   Return if symptoms worsen or fail to improve.      Follow up if symptoms worsen or persist or has  new or concerning symptoms, go to ER for severe symptoms.   Reviewed common medication effects and side effects and advised to report side effects immediately, the patient expressed good understanding.  Encouraged medication compliance and the importance of keeping scheduled follow up appointments with me and any other providers  If labs or images are ordered we will contact you with the results within the next week.  If you have not heard from us after a week please call our office to inquire about the results.   Patient was given instructions and counseling regarding his condition or for health maintenance advice. Please see specific information pulled into the AVS if appropriate.     Janette Hampton PA-C    * Please note that portions of this note may have been completed with a voice recognition program. Efforts were made to edit the dictation but occasionally words are erroneously transcribed.

## 2021-11-08 ENCOUNTER — APPOINTMENT (OUTPATIENT)
Dept: GENERAL RADIOLOGY | Facility: HOSPITAL | Age: 31
End: 2021-11-08

## 2021-11-08 VITALS
TEMPERATURE: 98.4 F | DIASTOLIC BLOOD PRESSURE: 96 MMHG | SYSTOLIC BLOOD PRESSURE: 153 MMHG | HEART RATE: 118 BPM | BODY MASS INDEX: 25.9 KG/M2 | OXYGEN SATURATION: 96 % | WEIGHT: 185 LBS | RESPIRATION RATE: 16 BRPM | HEIGHT: 71 IN

## 2021-11-08 PROCEDURE — 73610 X-RAY EXAM OF ANKLE: CPT

## 2021-11-08 PROCEDURE — 99283 EMERGENCY DEPT VISIT LOW MDM: CPT

## 2021-11-09 ENCOUNTER — HOSPITAL ENCOUNTER (EMERGENCY)
Facility: HOSPITAL | Age: 31
Discharge: HOME | End: 2021-11-09
Attending: EMERGENCY MEDICINE | Admitting: EMERGENCY MEDICINE

## 2021-11-09 DIAGNOSIS — S93.402A INVERSION SPRAIN OF LEFT ANKLE, INITIAL ENCOUNTER: Primary | ICD-10-CM

## 2021-11-09 RX ORDER — NAPROXEN 500 MG/1
500 TABLET ORAL 2 TIMES DAILY WITH MEALS
Qty: 20 TABLET | Refills: 0 | Status: SHIPPED | OUTPATIENT
Start: 2021-11-09

## 2021-11-09 RX ORDER — NAPROXEN 250 MG/1
500 TABLET ORAL ONCE
Status: COMPLETED | OUTPATIENT
Start: 2021-11-09 | End: 2021-11-09

## 2021-11-09 RX ADMIN — NAPROXEN 500 MG: 250 TABLET ORAL at 00:50

## 2021-11-09 NOTE — ED PROVIDER NOTES
[place]    EMERGENCY DEPARTMENT ENCOUNTER      Pt Name: Ap Sotomayor  MRN: [id number]  YOB: 1990  Date of evaluation: 11/8/2021  Provider: Conrad Leblanc DO    CHIEF COMPLAINT       Chief Complaint   Patient presents with   • Fall         HISTORY OF PRESENT ILLNESS  (Location/Symptom, Timing/Onset, Context/Setting, Quality, Duration, Modifying Factors, Severity.)   Ap Sotomayor is a 31 y.o. male who presents to the emergency department for evaluation of a inversion injury to the left ankle.  The patient had an incident where he rolled his ankle causing inversion injury to the left ankle with some mild swelling and discomfort located along the left lateral malleolus and anterior foot.  He is ambulatory although with painful gait.  Denies any pain to the proximal fibula, no other joint injury or pain.  Has had 1 prior inversion type injury in the past, no other surgical procedures.  Patient denies any other acute systemic complaints at this time.      Nursing notes were reviewed.    REVIEW OF SYSTEMS    (2-9 systems for level 4, 10 or more for level 5)   ROS:  General:  No fevers  Cardiovascular:  No chest pain  Respiratory:  No shortness of breath  Gastrointestinal:  No pain  Musculoskeletal:  No muscle pain, positive left ankle pain  Skin:  No rash  Neurologic:  No headache  Psychiatric:  No anxiety  Genitourinary:  No dysuria, no hematuria    Except as noted above the remainder of the review of systems was reviewed and negative.       PAST MEDICAL HISTORY   No past medical history on file.      SURGICAL HISTORY       Past Surgical History:   Procedure Laterality Date   • ENDOSCOPY  11/2014   • HERNIA REPAIR      umbilical hernia repair   • TOOTH EXTRACTION  10/2020         CURRENT MEDICATIONS     No current facility-administered medications for this encounter.    Current Outpatient Medications:   •  loratadine (CLARITIN) 10 MG tablet, Take 10 mg by mouth Daily., Disp: , Rfl:   •   "naproxen (Naprosyn) 500 MG tablet, Take 1 tablet by mouth 2 (Two) Times a Day With Meals., Disp: 20 tablet, Rfl: 0  •  predniSONE (DELTASONE) 10 MG tablet, Take 1 tablet by mouth Daily. 30mg x 2d, 20mg x 2d, 10mg x 2d, Disp: 12 tablet, Rfl: 0  •  triamcinolone (KENALOG) 0.025 % cream, Apply  topically to the appropriate area as directed 2 (Two) Times a Day., Disp: 15 g, Rfl: 0    ALLERGIES     Patient has no known allergies.    FAMILY HISTORY     No family history on file.       SOCIAL HISTORY       Social History     Socioeconomic History   • Marital status: Single   Tobacco Use   • Smoking status: Never Smoker   • Smokeless tobacco: Never Used   Substance and Sexual Activity   • Alcohol use: Yes     Comment: on occasion   • Drug use: No         PHYSICAL EXAM    (up to 7 for level 4, 8 or more for level 5)     Vitals:    11/08/21 2322   BP: 153/96   BP Location: Left arm   Patient Position: Sitting   Pulse: 118   Resp: 16   Temp: 98.4 °F (36.9 °C)   TempSrc: Oral   SpO2: 96%   Weight: 83.9 kg (185 lb)   Height: 180.3 cm (71\")       Physical Exam  General : Patient is awake, alert, oriented, in no acute distress, nontoxic appearing  HEENT: Pupils are equally round and reactive to light, EOMI, conjunctivae clear, sclerae white  Neck: Neck is supple, full range of motion, trachea midline  Cardiac: Heart regular rate, rhythm, no murmurs, rubs, or gallops  Lungs: Lungs are clear to auscultation, there is no wheezing, rhonchi, or rales. There is no use of accessory muscles  Abdomen: Abdomen is soft, nontender, nondistended. There are no firm or pulsatile masses, no rebound rigidity or guarding.   Musculoskeletal: Ligament without any significant injury or joint laxity on ligamentous testing.  Distal pulses neurovascular status intact.  No tenderness overlying the left knee or left fibular head.  5 out of 5 strength in all remaining there is tenderness overlying the left ATF extremities.  No focal muscle deficits are " "appreciated  Neuro: Motor intact, sensory intact, level of consciousness is normal  Dermatology: Skin is warm and dry  Psych: Mentation is grossly normal, cognition is grossly normal. Affect is appropriate.      DIAGNOSTIC RESULTS     EKG: All EKGs are interpreted by the Emergency Department Physician who either signs or Co-signs this chart in the absence of a cardiologist.    No orders to display       RADIOLOGY:   Non-plain film images such as CT, Ultrasound and MRI are read by the radiologist. Plain radiographic images are visualized and preliminarily interpreted by the emergency physician with the below findings:      [] Radiologist's Report Reviewed:  XR Ankle 3+ View Left   Final Result   Negative left ankle.      Signer Name: Bert Connolly MD    Signed: 11/8/2021 11:53 PM    Workstation Name: [id number]     [facility]            ED BEDSIDE ULTRASOUND:   Performed by ED Physician - none    LABS:    I have reviewed and interpreted all of the currently available lab results from this visit (if applicable):  Results for orders placed or performed in visit on 04/15/21   POC Rapid Strep A    Specimen: Swab   Result Value Ref Range    Rapid Strep A Screen Negative Negative, VALID, INVALID, Not Performed    Internal Control Passed Passed    Lot Number [id number]     Expiration Date 06/30/2021         All other labs were within normal range or not returned as of this dictation.      EMERGENCY DEPARTMENT COURSE and DIFFERENTIAL DIAGNOSIS/MDM:   Vitals:    Vitals:    11/08/21 2322   BP: 153/96   BP Location: Left arm   Patient Position: Sitting   Pulse: 118   Resp: 16   Temp: 98.4 °F (36.9 °C)   TempSrc: Oral   SpO2: 96%   Weight: 83.9 kg (185 lb)   Height: 180.3 cm (71\")            Patient with a inversion injury to left ankle with likely underlying ligamentous injury without fracture on x-ray.  We discussed symptomatic therapies, splint, ice elevation anti-inflammatories following up with his " PCP.  Weightbearing as tolerated with his crutches. I encouraged the patient to return to the emergency department immediately for ANY concerns, worsening, new complaints, or if symptoms persist and unable to seek follow-up in a timely fashion.  The patient/family expressed understanding and agreement with this plan.  The patient will follow-up with their PCP in 1-2 days for reevaluation.       MEDICATIONS ADMINISTERED IN ED:  Medications   naproxen (NAPROSYN) tablet 500 mg (500 mg Oral Given 11/9/21 0050)       PROCEDURES:  Procedures    CRITICAL CARE TIME    Total Critical Care time was 0 minutes, excluding separately reportable procedures.   There was a high probability of clinically significant/life threatening deterioration in the patient's condition which required my urgent intervention.      FINAL IMPRESSION      1. Inversion sprain of left ankle, initial encounter          DISPOSITION/PLAN     ED Disposition     ED Disposition Condition Comment    Discharge Stable           PATIENT REFERRED TO:  Zoraida Lindsay MD  [address] [place] [address]    [place] [address]  [phone]    In 2 days      [facility] Emergency Department  [address] [place] [address] [phone]    If symptoms worsen      DISCHARGE MEDICATIONS:     Medication List      START taking these medications    naproxen 500 MG tablet  Commonly known as: Naprosyn  Take 1 tablet by mouth 2 (Two) Times a Day With Meals.        CONTINUE taking these medications    loratadine 10 MG tablet  Commonly known as: CLARITIN     predniSONE 10 MG tablet  Commonly known as: DELTASONE  Take 1 tablet by mouth Daily. 30mg x 2d, 20mg x 2d, 10mg x 2d     triamcinolone 0.025 % cream  Commonly known as: KENALOG  Apply  topically to the appropriate area as directed 2 (Two) Times a Day.           Where to Get Your Medications      These medications were sent to [facility] [address] - [address]  AT  [facility] [address] - [phone] PH - [phone] FX  [address] [facility]  [place] [address]    Phone: [phone]   · naproxen 500 MG tablet             Comment: Please note this report has been produced using speech recognition software.      Conrad Leblanc DO  Attending Emergency Physician               Conrad Leblanc DO  11/09/21 5174

## 2022-04-26 ENCOUNTER — TELEMEDICINE (OUTPATIENT)
Dept: INTERNAL MEDICINE | Facility: CLINIC | Age: 32
End: 2022-04-26

## 2022-04-26 DIAGNOSIS — J06.9 UPPER RESPIRATORY TRACT INFECTION, UNSPECIFIED TYPE: Primary | ICD-10-CM

## 2022-04-26 LAB
EXPIRATION DATE: NORMAL
EXPIRATION DATE: NORMAL
FLUAV AG UPPER RESP QL IA.RAPID: NOT DETECTED
FLUBV AG UPPER RESP QL IA.RAPID: NOT DETECTED
INTERNAL CONTROL: NORMAL
INTERNAL CONTROL: NORMAL
Lab: NORMAL
Lab: NORMAL
S PYO AG THROAT QL: NEGATIVE
SARS-COV-2 RNA RESP QL NAA+PROBE: NOT DETECTED

## 2022-04-26 PROCEDURE — 87428 SARSCOV & INF VIR A&B AG IA: CPT | Performed by: STUDENT IN AN ORGANIZED HEALTH CARE EDUCATION/TRAINING PROGRAM

## 2022-04-26 PROCEDURE — 99213 OFFICE O/P EST LOW 20 MIN: CPT | Performed by: STUDENT IN AN ORGANIZED HEALTH CARE EDUCATION/TRAINING PROGRAM

## 2022-04-26 PROCEDURE — 87880 STREP A ASSAY W/OPTIC: CPT | Performed by: STUDENT IN AN ORGANIZED HEALTH CARE EDUCATION/TRAINING PROGRAM

## 2022-04-26 RX ORDER — AZITHROMYCIN 250 MG/1
TABLET, FILM COATED ORAL
Qty: 6 TABLET | Refills: 0 | Status: SHIPPED | OUTPATIENT
Start: 2022-04-26 | End: 2022-12-28

## 2022-04-26 RX ORDER — GUAIFENESIN AND CODEINE PHOSPHATE 100; 10 MG/5ML; MG/5ML
5 SOLUTION ORAL 3 TIMES DAILY PRN
Qty: 118 ML | Refills: 0 | Status: SHIPPED | OUTPATIENT
Start: 2022-04-26 | End: 2022-12-28

## 2022-04-26 NOTE — PROGRESS NOTES
You have chosen to receive care through a telehealth visit.  Do you consent to use a audio/video connection for your medical care today? Yes    This was an audio and video enabled telemedicine encounter.     Chief Complaint  No chief complaint on file.    Subjective    Patient seen in video call for evaluation of cough, congestion, sore throat.  Symptoms began on Thursday.  His sore throat has improved.  He continues to have productive cough that is worse at nighttime.  Denies fevers or chills.    Review of Systems  As noted above    The following portions of the patient's history were reviewed and updated as appropriate: allergies, current medications, past family history, past medical history, past social history, past surgical history and problem list.    No Known Allergies  Current Outpatient Medications on File Prior to Visit   Medication Sig Dispense Refill   • loratadine (CLARITIN) 10 MG tablet Take 10 mg by mouth Daily.     • naproxen (Naprosyn) 500 MG tablet Take 1 tablet by mouth 2 (Two) Times a Day With Meals. 20 tablet 0   • predniSONE (DELTASONE) 10 MG tablet Take 1 tablet by mouth Daily. 30mg x 2d, 20mg x 2d, 10mg x 2d 12 tablet 0   • triamcinolone (KENALOG) 0.025 % cream Apply  topically to the appropriate area as directed 2 (Two) Times a Day. 15 g 0     No current facility-administered medications on file prior to visit.     New Medications Ordered This Visit   Medications   • azithromycin (Zithromax Z-Vel) 250 MG tablet     Sig: Take 2 tablets by mouth on day 1, then 1 tablet daily on days 2-5     Dispense:  6 tablet     Refill:  0   • guaiFENesin-codeine (GUAIFENESIN AC) 100-10 MG/5ML liquid     Sig: Take 5 mL by mouth 3 (Three) Times a Day As Needed for Cough or Congestion.     Dispense:  118 mL     Refill:  0       Social History     Tobacco Use   Smoking Status Never Smoker   Smokeless Tobacco Never Used        Objective   There were no vitals filed for this visit.  There is no height or weight  on file to calculate BMI.    Physical Exam   Constitutional: He appears well-developed and well-nourished. No distress.   HENT:   Head: Normocephalic and atraumatic.   Eyes: Conjunctivae and EOM are normal.   Neck: Neck normal appearance.  Pulmonary/Chest: Effort normal.  No respiratory distress. He no audible wheeze...  Neurological: He is alert.   Psychiatric: He has a normal mood and affect.   Vitals reviewed.      Diagnoses and all orders for this visit:    1. Upper respiratory tract infection, unspecified type (Primary)  -     POCT rapid strep A  -     Covid-19 + Flu A&B AG, Veritor  -     azithromycin (Zithromax Z-Vel) 250 MG tablet; Take 2 tablets by mouth on day 1, then 1 tablet daily on days 2-5  Dispense: 6 tablet; Refill: 0  -     guaiFENesin-codeine (GUAIFENESIN AC) 100-10 MG/5ML liquid; Take 5 mL by mouth 3 (Three) Times a Day As Needed for Cough or Congestion.  Dispense: 118 mL; Refill: 0      Strep, COVID, flu all negative.  Z-Vel sent to pharmacy as well as guaifenesin with codeine for cough.  Follow-up if symptoms worsen or fail to improve.      Return if symptoms worsen or fail to improve.    Follow up if symptoms worsen or persist or has new or concerning symptoms, go to ER for severe symptoms.   I discussed my findings, recommendations, and plan of care with the patient. Reviewed common medication effects and side effects and to report side effects immediately. Encouraged medication compliance and the importance of keeping scheduled follow up appointments. Pt verbalized understanding and agreement.    If labs or images are ordered we will contact you with the results within the next week.  If you have not heard from us after a week please call our office to inquire about the results.     I have reviewed the limitations of a telehealth visit (such as lack of a physical exam and unable to obtain vital signs) and advised the patient that they may need to follow up for an office visit should their  symptoms or concerns persist, worsen, or change.    Dariusz Tarango MD    * Please note that portions of this note may have been completed with a voice recognition program. Efforts were made to edit the dictation but occasionally words are erroneously transcribed.

## 2022-05-02 ENCOUNTER — OFFICE VISIT (OUTPATIENT)
Dept: INTERNAL MEDICINE | Facility: CLINIC | Age: 32
End: 2022-05-02

## 2022-05-02 VITALS
DIASTOLIC BLOOD PRESSURE: 78 MMHG | TEMPERATURE: 96.9 F | OXYGEN SATURATION: 98 % | SYSTOLIC BLOOD PRESSURE: 122 MMHG | HEART RATE: 76 BPM

## 2022-05-02 DIAGNOSIS — J30.9 ALLERGIC SINUSITIS: Primary | ICD-10-CM

## 2022-05-02 PROCEDURE — 99213 OFFICE O/P EST LOW 20 MIN: CPT | Performed by: STUDENT IN AN ORGANIZED HEALTH CARE EDUCATION/TRAINING PROGRAM

## 2022-05-02 RX ORDER — AZELASTINE 1 MG/ML
2 SPRAY, METERED NASAL 2 TIMES DAILY
Qty: 30 ML | Refills: 0 | Status: SHIPPED | OUTPATIENT
Start: 2022-05-02

## 2022-05-02 RX ORDER — ACETAMINOPHEN,DIPHENHYDRAMINE HCL 500; 25 MG/1; MG/1
1 TABLET, FILM COATED ORAL NIGHTLY PRN
COMMUNITY

## 2022-05-02 RX ORDER — METHYLPREDNISOLONE 4 MG/1
TABLET ORAL
Qty: 21 EACH | Refills: 0 | Status: SHIPPED | OUTPATIENT
Start: 2022-05-02 | End: 2022-12-28

## 2022-05-02 NOTE — PROGRESS NOTES
Chief Complaint  Cough    Ap Sotomayor presents to [facility]      Subjective     Patient seen last week via telehealth for sinusitis. Was given zpack and guaifenesin with codeine. Symptoms gradually improved however over the last day or so he has noticed more sinus pressure and congestion. No fevers or chills. No chest pain or SOB.     The following portions of the patient's history were reviewed and updated as appropriate: allergies, current medications, past family history, past medical history, past social history, past surgical history and problem list.      Health Maintenance   Topic Date Due   • ANNUAL PHYSICAL  Never done   • TDAP/TD VACCINES (1 - Tdap) Never done   • HEPATITIS C SCREENING  Never done   • COVID-19 Vaccine (3 - Booster for Moderna series) 08/31/2021   • INFLUENZA VACCINE  08/01/2022   • Pneumococcal Vaccine 0-64  Aged Out         Procedures       History reviewed. No pertinent past medical history.   No Known Allergies   Social History     Tobacco Use   • Smoking status: Never Smoker   • Smokeless tobacco: Never Used   Substance Use Topics   • Alcohol use: Yes     Comment: on occasion   • Drug use: No     Past Surgical History:   Procedure Laterality Date   • ENDOSCOPY  11/2014   • HERNIA REPAIR      umbilical hernia repair   • TOOTH EXTRACTION  10/2020      History reviewed. No pertinent family history.      Current Outpatient Medications:   •  guaiFENesin-codeine (GUAIFENESIN AC) 100-10 MG/5ML liquid, Take 5 mL by mouth 3 (Three) Times a Day As Needed for Cough or Congestion., Disp: 118 mL, Rfl: 0  •  azelastine (ASTELIN) 0.1 % nasal spray, 2 sprays into the nostril(s) as directed by provider 2 (Two) Times a Day. Use in each nostril as directed, Disp: 30 mL, Rfl: 0  •  azithromycin (Zithromax Z-Vel) 250 MG tablet, Take 2 tablets by mouth on day 1, then 1 tablet daily on days 2-5, Disp: 6 tablet, Rfl: 0  •  diphenhydrAMINE-acetaminophen (TYLENOL PM)   MG tablet per tablet, Take 1 tablet by mouth At Night As Needed for Sleep., Disp: , Rfl:   •  loratadine (CLARITIN) 10 MG tablet, Take 10 mg by mouth Daily., Disp: , Rfl:   •  methylPREDNISolone (MEDROL) 4 MG dose pack, Take as directed on package instructions., Disp: 21 each, Rfl: 0  •  naproxen (Naprosyn) 500 MG tablet, Take 1 tablet by mouth 2 (Two) Times a Day With Meals., Disp: 20 tablet, Rfl: 0  •  triamcinolone (KENALOG) 0.025 % cream, Apply  topically to the appropriate area as directed 2 (Two) Times a Day., Disp: 15 g, Rfl: 0    Objective   Vital Signs  /78   Pulse 76   Temp 96.9 °F (36.1 °C) (Temporal)   SpO2 98%   There is no height or weight on file to calculate BMI.     Physical Exam  Constitutional:       General: He is not in acute distress.     Appearance: He is not ill-appearing, toxic-appearing or diaphoretic.   HENT:      Right Ear: Tympanic membrane and external ear normal.      Left Ear: Tympanic membrane, ear canal and external ear normal.      Nose: Congestion and rhinorrhea present.      Mouth/Throat:      Mouth: Mucous membranes are moist.      Pharynx: Oropharynx is clear. No oropharyngeal exudate.   Eyes:      Extraocular Movements: Extraocular movements intact.      Pupils: Pupils are equal, round, and reactive to light.   Cardiovascular:      Rate and Rhythm: Normal rate and regular rhythm.      Heart sounds: No murmur heard.  Pulmonary:      Effort: Pulmonary effort is normal. No respiratory distress.      Breath sounds: Normal breath sounds. No wheezing or rales.   Skin:     Capillary Refill: Capillary refill takes less than 2 seconds.          Assessment and Plan  Diagnoses and all orders for this visit:    1. Allergic sinusitis (Primary)  -     methylPREDNISolone (MEDROL) 4 MG dose pack; Take as directed on package instructions.  Dispense: 21 each; Refill: 0  -     azelastine (ASTELIN) 0.1 % nasal spray; 2 sprays into the nostril(s) as directed by provider 2 (Two)  Times a Day. Use in each nostril as directed  Dispense: 30 mL; Refill: 0       Completed z-pack. Some residual drainage on exam today. Will give medrol dose pack and azelastine spray.         Follow Up    Return if symptoms worsen or fail to improve.    Patient was given instructions and counseling regarding his condition or for health maintenance advice. Please see specific information pulled into the AVS if appropriate.    Electronically signed by:   Dariusz Tarango MD  05/02/2022

## 2022-12-28 ENCOUNTER — OFFICE VISIT (OUTPATIENT)
Dept: INTERNAL MEDICINE | Facility: CLINIC | Age: 32
End: 2022-12-28

## 2022-12-28 VITALS
RESPIRATION RATE: 20 BRPM | HEIGHT: 72 IN | DIASTOLIC BLOOD PRESSURE: 78 MMHG | SYSTOLIC BLOOD PRESSURE: 110 MMHG | OXYGEN SATURATION: 97 % | HEART RATE: 95 BPM | BODY MASS INDEX: 25.09 KG/M2 | TEMPERATURE: 98 F

## 2022-12-28 DIAGNOSIS — J06.9 UPPER RESPIRATORY TRACT INFECTION, UNSPECIFIED TYPE: ICD-10-CM

## 2022-12-28 DIAGNOSIS — R09.89 CHEST CONGESTION: ICD-10-CM

## 2022-12-28 DIAGNOSIS — R05.1 ACUTE COUGH: Primary | ICD-10-CM

## 2022-12-28 LAB
EXPIRATION DATE: NORMAL
FLUAV AG UPPER RESP QL IA.RAPID: NOT DETECTED
FLUBV AG UPPER RESP QL IA.RAPID: NOT DETECTED
INTERNAL CONTROL: NORMAL
Lab: NORMAL
SARS-COV-2 RNA RESP QL NAA+PROBE: NOT DETECTED

## 2022-12-28 PROCEDURE — 87636 SARSCOV2 & INF A&B AMP PRB: CPT | Performed by: NURSE PRACTITIONER

## 2022-12-28 PROCEDURE — 99213 OFFICE O/P EST LOW 20 MIN: CPT | Performed by: NURSE PRACTITIONER

## 2022-12-28 RX ORDER — AZITHROMYCIN 250 MG/1
TABLET, FILM COATED ORAL
Qty: 6 TABLET | Refills: 0 | Status: SHIPPED | OUTPATIENT
Start: 2022-12-28

## 2022-12-28 NOTE — PROGRESS NOTES
Subjective   Ap Sotomayor is a 32 y.o. male    Chief Complaint   Patient presents with   • Cough     3 days   • Nasal Congestion   • Headache   • URI     URI   This is a new problem. The current episode started in the past 7 days. The problem has been waxing and waning. There has been no fever. Associated symptoms include congestion, coughing, headaches and a plugged ear sensation. Pertinent negatives include no abdominal pain, chest pain, diarrhea, dysuria, ear pain, joint pain, joint swelling, nausea, neck pain, rash, rhinorrhea, sinus pain, sneezing, sore throat, swollen glands, vomiting or wheezing. He has tried nothing for the symptoms. The treatment provided no relief.        The following portions of the patient's history were reviewed and updated as appropriate: allergies, current medications, past family history, past medical history, past social history, past surgical history and problem list.    Current Outpatient Medications:   •  azelastine (ASTELIN) 0.1 % nasal spray, 2 sprays into the nostril(s) as directed by provider 2 (Two) Times a Day. Use in each nostril as directed, Disp: 30 mL, Rfl: 0  •  diphenhydrAMINE-acetaminophen (TYLENOL PM)  MG tablet per tablet, Take 1 tablet by mouth At Night As Needed for Sleep., Disp: , Rfl:   •  naproxen (Naprosyn) 500 MG tablet, Take 1 tablet by mouth 2 (Two) Times a Day With Meals., Disp: 20 tablet, Rfl: 0  •  azithromycin (Zithromax Z-Vel) 250 MG tablet, Take 2 tablets by mouth on day 1, then 1 tablet daily on days 2-5, Disp: 6 tablet, Rfl: 0     Review of Systems   Constitutional: Negative for chills, fatigue and fever.   HENT: Positive for congestion. Negative for ear pain, rhinorrhea, sinus pain, sneezing and sore throat.    Respiratory: Positive for cough. Negative for chest tightness, shortness of breath and wheezing.    Cardiovascular: Negative for chest pain.   Gastrointestinal: Negative for abdominal pain, diarrhea, nausea and vomiting.  "  Endocrine: Negative for cold intolerance and heat intolerance.   Genitourinary: Negative for dysuria.   Musculoskeletal: Negative for arthralgias, joint pain and neck pain.   Skin: Negative for rash.   Neurological: Positive for headaches. Negative for dizziness.       Objective   Physical Exam  Constitutional:       Appearance: He is well-developed.   HENT:      Head: Normocephalic and atraumatic.      Right Ear: A middle ear effusion is present.      Left Ear: A middle ear effusion is present.      Nose: Congestion present.      Right Sinus: No maxillary sinus tenderness or frontal sinus tenderness.      Left Sinus: No maxillary sinus tenderness or frontal sinus tenderness.      Mouth/Throat:      Pharynx: Posterior oropharyngeal erythema present.   Eyes:      Conjunctiva/sclera: Conjunctivae normal.      Pupils: Pupils are equal, round, and reactive to light.   Cardiovascular:      Rate and Rhythm: Normal rate and regular rhythm.      Heart sounds: Normal heart sounds.   Pulmonary:      Effort: Pulmonary effort is normal.      Breath sounds: Normal breath sounds.   Abdominal:      General: Bowel sounds are normal.      Palpations: Abdomen is soft.   Musculoskeletal:         General: Normal range of motion.      Cervical back: Normal range of motion.   Skin:     General: Skin is warm and dry.   Neurological:      Mental Status: He is alert and oriented to person, place, and time.      Deep Tendon Reflexes: Reflexes are normal and symmetric.   Psychiatric:         Behavior: Behavior normal.         Thought Content: Thought content normal.         Judgment: Judgment normal.       Vitals:    12/28/22 1041   BP: 110/78   Cuff Size: Adult   Pulse: 95   Resp: 20   Temp: 98 °F (36.7 °C)   TempSrc: Infrared   SpO2: 97%   Height: 182.9 cm (72\")         Assessment & Plan   Diagnoses and all orders for this visit:    1. Acute cough (Primary)  -     Covid-19 + Flu A&B AG, Veritor    2. Chest congestion  -     Covid-19 + Flu " A&B AG, Veritor    3. Upper respiratory tract infection, unspecified type  -     azithromycin (Zithromax Z-Vel) 250 MG tablet; Take 2 tablets by mouth on day 1, then 1 tablet daily on days 2-5  Dispense: 6 tablet; Refill: 0      COVID and flu test were negative  Covered with azithromycin  Will also  OTC Robitussin-DM  Increase fluids  May also use OTC Flonase and/or saline nasal spray  Return to clinic if symptoms worsen or do not improve.

## 2023-06-12 ENCOUNTER — OFFICE VISIT (OUTPATIENT)
Dept: INTERNAL MEDICINE | Facility: CLINIC | Age: 33
End: 2023-06-12
Payer: COMMERCIAL

## 2023-06-12 VITALS
DIASTOLIC BLOOD PRESSURE: 82 MMHG | BODY MASS INDEX: 25.73 KG/M2 | HEIGHT: 72 IN | WEIGHT: 190 LBS | SYSTOLIC BLOOD PRESSURE: 110 MMHG | HEART RATE: 79 BPM | OXYGEN SATURATION: 97 % | TEMPERATURE: 97.8 F

## 2023-06-12 DIAGNOSIS — J06.9 UPPER RESPIRATORY TRACT INFECTION, UNSPECIFIED TYPE: Primary | ICD-10-CM

## 2023-06-12 LAB
EXPIRATION DATE: NORMAL
FLUAV AG UPPER RESP QL IA.RAPID: NOT DETECTED
FLUBV AG UPPER RESP QL IA.RAPID: NOT DETECTED
INTERNAL CONTROL: NORMAL
Lab: NORMAL
SARS-COV-2 AG UPPER RESP QL IA.RAPID: NOT DETECTED

## 2023-06-12 PROCEDURE — 87428 SARSCOV & INF VIR A&B AG IA: CPT | Performed by: STUDENT IN AN ORGANIZED HEALTH CARE EDUCATION/TRAINING PROGRAM

## 2023-06-12 PROCEDURE — 99213 OFFICE O/P EST LOW 20 MIN: CPT | Performed by: STUDENT IN AN ORGANIZED HEALTH CARE EDUCATION/TRAINING PROGRAM

## 2023-06-12 RX ORDER — AZITHROMYCIN 250 MG/1
TABLET, FILM COATED ORAL
Qty: 6 TABLET | Refills: 0 | Status: SHIPPED | OUTPATIENT
Start: 2023-06-12

## 2023-06-12 RX ORDER — METHYLPREDNISOLONE 4 MG/1
TABLET ORAL
Qty: 21 TABLET | Refills: 0 | Status: SHIPPED | OUTPATIENT
Start: 2023-06-12

## 2023-06-12 NOTE — PROGRESS NOTES
Office Note     Name: Ap Sotomayor    : 1990     MRN: [id number]     Chief Complaint  Cough (Symptoms started Saturday ), Headache, and Nasal Congestion (Sneezing /)    Subjective     History of Present Illness:  Ap Sotomayor is a 32 y.o. male who presents today for     Sinus Pain  Patient complains of congestion, cough and sinus pressure. Onset of symptoms was a few days ago. Symptoms have been stable since that time. He is drinking plenty of fluids.  Past history is significant for nothing. Patient is non-smoker.      Was given otc dayquill      Review of Systems:   Review of Systems   All other systems reviewed and are negative.      Past Medical History: History reviewed. No pertinent past medical history.    Past Surgical History:   Past Surgical History:   Procedure Laterality Date   • ENDOSCOPY  2014   • HERNIA REPAIR      umbilical hernia repair   • TOOTH EXTRACTION  10/2020       Family History: History reviewed. No pertinent family history.    Social History:   Social History     Socioeconomic History   • Marital status: Single   Tobacco Use   • Smoking status: Never   • Smokeless tobacco: Never   Substance and Sexual Activity   • Alcohol use: Yes     Comment: on occasion   • Drug use: No       Immunizations:   Immunization History   Administered Date(s) Administered   • COVID-19 (MODERNA) 1st,2nd,3rd Dose Monovalent 2021, 2021   • COVID-19 (PFIZER) BIVALENT 12+YRS 10/22/2022   • Flucelvax Quad Vial =>4yrs 10/01/2020   • Fluzone Quad >6mos (Multi-dose) 2019        Medications:     Current Outpatient Medications:   •  azelastine (ASTELIN) 0.1 % nasal spray, 2 sprays into the nostril(s) as directed by provider 2 (Two) Times a Day. Use in each nostril as directed, Disp: 30 mL, Rfl: 0  •  azithromycin (Zithromax Z-Vel) 250 MG tablet, Take 2 tablets by mouth on day 1, then 1 tablet daily on days 2-5, Disp: 6 tablet, Rfl: 0  •  diphenhydrAMINE-acetaminophen (TYLENOL  "PM)  MG tablet per tablet, Take 1 tablet by mouth At Night As Needed for Sleep. (Patient not taking: Reported on 6/12/2023), Disp: , Rfl:   •  methylPREDNISolone (MEDROL) 4 MG dose pack, Take as directed on package instructions., Disp: 21 tablet, Rfl: 0  •  naproxen (Naprosyn) 500 MG tablet, Take 1 tablet by mouth 2 (Two) Times a Day With Meals. (Patient not taking: Reported on 6/12/2023), Disp: 20 tablet, Rfl: 0    Allergies:   No Known Allergies    Objective     Vital Signs  /82   Pulse 79   Temp 97.8 °F (36.6 °C) (Temporal)   Ht 182.9 cm (72.01\")   Wt 86.2 kg (190 lb)   SpO2 97%   BMI 25.76 kg/m²   Estimated body mass index is 25.76 kg/m² as calculated from the following:    Height as of this encounter: 182.9 cm (72.01\").    Weight as of this encounter: 86.2 kg (190 lb).          Physical Exam  Constitutional:       Appearance: Normal appearance.   HENT:      Nose: Congestion present.   Cardiovascular:      Rate and Rhythm: Normal rate and regular rhythm.      Pulses: Normal pulses.      Heart sounds: Normal heart sounds.   Pulmonary:      Effort: Pulmonary effort is normal.      Breath sounds: Normal breath sounds.   Abdominal:      General: Abdomen is flat.      Palpations: Abdomen is soft.   Neurological:      Mental Status: He is alert.          Result Review :                  Assessment and Plan     1. Upper respiratory tract infection, unspecified type    - POCT SARS-CoV-2 Antigen YI + Flu  - azithromycin (Zithromax Z-Vel) 250 MG tablet; Take 2 tablets by mouth on day 1, then 1 tablet daily on days 2-5  Dispense: 6 tablet; Refill: 0  - methylPREDNISolone (MEDROL) 4 MG dose pack; Take as directed on package instructions.  Dispense: 21 tablet; Refill: 0       Follow Up  No follow-ups on file.    Woo Zavala MD  [facility]  [facility]  [address] [address] [phone]    "

## 2023-10-20 ENCOUNTER — OFFICE VISIT (OUTPATIENT)
Dept: INTERNAL MEDICINE | Facility: CLINIC | Age: 33
End: 2023-10-20
Payer: COMMERCIAL

## 2023-10-20 VITALS
TEMPERATURE: 98.1 F | RESPIRATION RATE: 20 BRPM | HEART RATE: 94 BPM | DIASTOLIC BLOOD PRESSURE: 72 MMHG | HEIGHT: 72 IN | WEIGHT: 195 LBS | SYSTOLIC BLOOD PRESSURE: 120 MMHG | OXYGEN SATURATION: 97 % | BODY MASS INDEX: 26.41 KG/M2

## 2023-10-20 DIAGNOSIS — J01.00 ACUTE MAXILLARY SINUSITIS, RECURRENCE NOT SPECIFIED: ICD-10-CM

## 2023-10-20 DIAGNOSIS — R05.1 ACUTE COUGH: Primary | ICD-10-CM

## 2023-10-20 LAB
EXPIRATION DATE: NORMAL
EXPIRATION DATE: NORMAL
FLUAV AG NPH QL: NEGATIVE
FLUBV AG NPH QL: NEGATIVE
INTERNAL CONTROL: NORMAL
INTERNAL CONTROL: NORMAL
Lab: NORMAL
Lab: NORMAL
SARS-COV-2 AG UPPER RESP QL IA.RAPID: NOT DETECTED

## 2023-10-20 RX ORDER — AZITHROMYCIN 250 MG/1
TABLET, FILM COATED ORAL
Qty: 6 TABLET | Refills: 0 | Status: SHIPPED | OUTPATIENT
Start: 2023-10-20

## 2023-10-20 RX ORDER — BROMPHENIRAMINE MALEATE, PSEUDOEPHEDRINE HYDROCHLORIDE, AND DEXTROMETHORPHAN HYDROBROMIDE 2; 30; 10 MG/5ML; MG/5ML; MG/5ML
5 SYRUP ORAL 3 TIMES DAILY PRN
Qty: 118 ML | Refills: 0 | Status: SHIPPED | OUTPATIENT
Start: 2023-10-20

## 2023-10-20 NOTE — PROGRESS NOTES
"Chief Complaint  Cough, Generalized Body Aches, and Nasal Congestion    Subjective          Ap Mandeep Sotomayor presents to [facility]  History of Present Illness    URI symptoms of cough, aches, nasal congestion over last 3 days. Both ears hurt. Throat is sore. Did have fever to 100.4 yesterday. Has been taking tylenol sinus but has not helped that much  His wife and son have been sick as well w/ similar symptoms and are on antibiotics.   He is a nonsmoker      Current Outpatient Medications:     azelastine (ASTELIN) 0.1 % nasal spray, 2 sprays into the nostril(s) as directed by provider 2 (Two) Times a Day. Use in each nostril as directed (Patient not taking: Reported on 10/20/2023), Disp: 30 mL, Rfl: 0    azithromycin (Zithromax Z-Vel) 250 MG tablet, Take 2 tablets the first day, then 1 tablet daily for 4 days., Disp: 6 tablet, Rfl: 0    brompheniramine-pseudoephedrine-DM 30-2-10 MG/5ML syrup, Take 5 mL by mouth 3 (Three) Times a Day As Needed for Allergies., Disp: 118 mL, Rfl: 0    diphenhydrAMINE-acetaminophen (TYLENOL PM)  MG tablet per tablet, Take 1 tablet by mouth At Night As Needed for Sleep. (Patient not taking: Reported on 10/20/2023), Disp: , Rfl:    The following portions of the patient's history were reviewed and updated as appropriate: allergies, current medications, past family history, past medical history, past social history, past surgical history and problem list.     Objective   Vital Signs:   /72   Pulse 94   Temp 98.1 °F (36.7 °C)   Resp 20   Ht 182.9 cm (72.01\")   Wt 88.5 kg (195 lb) Comment: pt reported  SpO2 97%   BMI 26.44 kg/m²       Physical exam  Constitutional: oriented to person, place, and time.  well-developed and well-nourished. No distress.   HENT:   Head: Normocephalic and atraumatic.   OP: erythematous, no exudate or drainage  Sinuses: nontender  TM: left dull. No erythema  Eyes: Conjunctivae and EOM are normal. "   Cardiovascular: Normal rate, regular rhythm and normal heart sounds.  Exam reveals no gallop and no friction rub.    No murmur heard.  Pulmonary/Chest: Effort normal and breath sounds normal. No respiratory distress.   no wheezes.   Neurological:  alert and oriented to person, place, and time.   Skin: Skin is warm and dry. not diaphoretic.   Psychiatric:  normal mood and affect. behavior is normal. Judgment and thought content normal.      Result Review :                   Assessment and Plan    Diagnoses and all orders for this visit:    1. Acute cough (Primary)  -     POCT SARS-CoV-2 Antigen YI  -     POCT Influenza A/B  -     azithromycin (Zithromax Z-Vel) 250 MG tablet; Take 2 tablets the first day, then 1 tablet daily for 4 days.  Dispense: 6 tablet; Refill: 0  -     brompheniramine-pseudoephedrine-DM 30-2-10 MG/5ML syrup; Take 5 mL by mouth 3 (Three) Times a Day As Needed for Allergies.  Dispense: 118 mL; Refill: 0    2. Acute maxillary sinusitis, recurrence not specified  -     azithromycin (Zithromax Z-Vel) 250 MG tablet; Take 2 tablets the first day, then 1 tablet daily for 4 days.  Dispense: 6 tablet; Refill: 0  -     brompheniramine-pseudoephedrine-DM 30-2-10 MG/5ML syrup; Take 5 mL by mouth 3 (Three) Times a Day As Needed for Allergies.  Dispense: 118 mL; Refill: 0    Fluids/rest. Call if no better.     Follow Up   No follow-ups on file.  Patient was given instructions and counseling regarding his condition or for health maintenance advice. Please see specific information pulled into the AVS if appropriate.

## 2024-02-20 ENCOUNTER — OFFICE VISIT (OUTPATIENT)
Dept: INTERNAL MEDICINE | Facility: CLINIC | Age: 34
End: 2024-02-20
Payer: COMMERCIAL

## 2024-02-20 ENCOUNTER — LAB (OUTPATIENT)
Dept: LAB | Facility: HOSPITAL | Age: 34
End: 2024-02-20
Payer: COMMERCIAL

## 2024-02-20 VITALS
BODY MASS INDEX: 26.25 KG/M2 | WEIGHT: 193.8 LBS | RESPIRATION RATE: 18 BRPM | SYSTOLIC BLOOD PRESSURE: 110 MMHG | HEIGHT: 72 IN | HEART RATE: 72 BPM | OXYGEN SATURATION: 97 % | DIASTOLIC BLOOD PRESSURE: 68 MMHG | TEMPERATURE: 97.1 F

## 2024-02-20 DIAGNOSIS — Z00.00 ANNUAL PHYSICAL EXAM: Primary | ICD-10-CM

## 2024-02-20 DIAGNOSIS — Z11.59 ENCOUNTER FOR HEPATITIS C SCREENING TEST FOR LOW RISK PATIENT: ICD-10-CM

## 2024-02-20 DIAGNOSIS — D22.9 MULTIPLE NEVI: ICD-10-CM

## 2024-02-20 DIAGNOSIS — D36.7 DERMOID CYST OF RIGHT UPPER EXTREMITY: ICD-10-CM

## 2024-02-20 DIAGNOSIS — Z00.00 ANNUAL PHYSICAL EXAM: ICD-10-CM

## 2024-02-20 LAB
ALBUMIN SERPL-MCNC: 4.3 G/DL (ref 3.5–5.2)
ALBUMIN/GLOB SERPL: 1.6 G/DL
ALP SERPL-CCNC: 89 U/L (ref 39–117)
ALT SERPL W P-5'-P-CCNC: 69 U/L (ref 1–41)
ANION GAP SERPL CALCULATED.3IONS-SCNC: 13 MMOL/L (ref 5–15)
AST SERPL-CCNC: 41 U/L (ref 1–40)
BILIRUB SERPL-MCNC: <0.2 MG/DL (ref 0–1.2)
BUN SERPL-MCNC: 16 MG/DL (ref 6–20)
BUN/CREAT SERPL: 13.4 (ref 7–25)
CALCIUM SPEC-SCNC: 9.7 MG/DL (ref 8.6–10.5)
CHLORIDE SERPL-SCNC: 106 MMOL/L (ref 98–107)
CHOLEST SERPL-MCNC: 215 MG/DL (ref 0–200)
CO2 SERPL-SCNC: 23 MMOL/L (ref 22–29)
CREAT SERPL-MCNC: 1.19 MG/DL (ref 0.76–1.27)
DEPRECATED RDW RBC AUTO: 39 FL (ref 37–54)
EGFRCR SERPLBLD CKD-EPI 2021: 82.2 ML/MIN/1.73
ERYTHROCYTE [DISTWIDTH] IN BLOOD BY AUTOMATED COUNT: 13.5 % (ref 12.3–15.4)
GLOBULIN UR ELPH-MCNC: 2.7 GM/DL
GLUCOSE SERPL-MCNC: 108 MG/DL (ref 65–99)
HCT VFR BLD AUTO: 44.8 % (ref 37.5–51)
HDLC SERPL-MCNC: 33 MG/DL (ref 40–60)
HGB BLD-MCNC: 15.1 G/DL (ref 13–17.7)
LDLC SERPL CALC-MCNC: 128 MG/DL (ref 0–100)
LDLC/HDLC SERPL: 3.68 {RATIO}
MCH RBC QN AUTO: 27.2 PG (ref 26.6–33)
MCHC RBC AUTO-ENTMCNC: 33.7 G/DL (ref 31.5–35.7)
MCV RBC AUTO: 80.6 FL (ref 79–97)
PLATELET # BLD AUTO: 303 10*3/MM3 (ref 140–450)
PMV BLD AUTO: 11.1 FL (ref 6–12)
POTASSIUM SERPL-SCNC: 4.4 MMOL/L (ref 3.5–5.2)
PROT SERPL-MCNC: 7 G/DL (ref 6–8.5)
RBC # BLD AUTO: 5.56 10*6/MM3 (ref 4.14–5.8)
SODIUM SERPL-SCNC: 142 MMOL/L (ref 136–145)
TRIGL SERPL-MCNC: 302 MG/DL (ref 0–150)
VLDLC SERPL-MCNC: 54 MG/DL (ref 5–40)
WBC NRBC COR # BLD AUTO: 7.78 10*3/MM3 (ref 3.4–10.8)

## 2024-02-20 PROCEDURE — 99395 PREV VISIT EST AGE 18-39: CPT | Performed by: NURSE PRACTITIONER

## 2024-02-20 PROCEDURE — 80053 COMPREHEN METABOLIC PANEL: CPT

## 2024-02-20 PROCEDURE — 85027 COMPLETE CBC AUTOMATED: CPT

## 2024-02-20 PROCEDURE — 80061 LIPID PANEL: CPT

## 2024-02-20 PROCEDURE — 86803 HEPATITIS C AB TEST: CPT

## 2024-02-20 NOTE — PROGRESS NOTES
"  Patient Care Team:  Zoraida Lindsay MD as PCP - General (Internal Medicine)     Chief Complaint   Patient presents with    Annual Exam     Pt is fasting           Patient is a 33 y.o. male who presents for his yearly physical exam.     HPI   He is here for annual exam. His influenza vaccination was last completed 10/10/2023 and is due again fall 2024. His tetanus vaccination was last completed 02/11/2023 with Tdap and is next due 2033. His COVID-19 vaccination was done 10/22/2022. He has never undergone a Hepatitis C screening.    He denies any changes in his medical history since his last visit.    He reports he has a raised area on his right upper arm. He states it has been presents for a while. He reports it has not increased in size. He notes it is painful when he squeezes on it. He has several moles on his back. He denies they are increasing in size. He has not been seen by a dermatologist since he was in his teens.    He reports he experiences indigestion and heartburn once a week. He denies taking any medication for it. He states he has noticed a \"sulfur\" taste in his mouth. He states his heartburn was worse in the past but it has improved. He underwent an EGD a few years ago, which was normal.    The patient denies smoking, vaping, or using tobacco products. 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    He reports his mother had skin cancer.    He declines the COVID-19 vaccination today.    Health maintenance/lifestyle:  Health Maintenance   Topic Date Due    HEPATITIS C SCREENING  Never done    ANNUAL PHYSICAL  Never done    COVID-19 Vaccine (4 - 2023-24 season) 02/22/2024 (Originally 9/1/2023)    BMI FOLLOWUP  02/20/2025    TDAP/TD VACCINES (2 - Td or Tdap) 02/11/2033    INFLUENZA VACCINE  Completed    Pneumococcal Vaccine 0-64  Aged Out     Immunization History   Administered Date(s) Administered    COVID-19 (MODERNA) 1st,2nd,3rd Dose Monovalent 03/03/2021, 03/31/2021    COVID-19 (PFIZER) BIVALENT 12+YRS " 10/22/2022    Flublok 18+yrs 10/10/2023    Flucelvax Quad Vial =>4yrs 10/01/2020    Fluzone Quad >6mos (Multi-dose) 09/12/2019    Influenza Injectable Mdck Pf Quad 10/22/2022    Influenza Quad Vaccine (Inpatient) 09/15/2010    Tdap 02/11/2023     Cancer-related family history is not on file.   has no history on file for sexual activity.  Social History     Tobacco Use   Smoking Status Never   Smokeless Tobacco Never     Social History     Substance and Sexual Activity   Alcohol Use Yes    Comment: on occasion       Covid vaccine:10/22/2022    Influenza: 10/10/2023  Tetanus: 02/11/2023    Eye exam: up to date  Dental exam: up to date    Sunscreen use: yes, occasionally  Diet: He eats a healthy diet. He eats fast food once or twice a week. He drinks about 2 sodas a day.  Exercise: He has not been exercising routinely secondary to having a 1-year-old child.      PHQ-2 Depression Screening  Little interest or pleasure in doing things? 0-->not at all   Feeling down, depressed, or hopeless? 0-->not at all   PHQ-2 Total Score 0         Review of Systems   Constitutional:  Negative for fatigue.   Respiratory:  Negative for shortness of breath.    Cardiovascular:  Negative for chest pain and leg swelling.   Gastrointestinal:  Positive for GERD and indigestion. Negative for abdominal pain, constipation, diarrhea, nausea and vomiting.   Endocrine: Negative for cold intolerance, heat intolerance, polydipsia, polyphagia and polyuria.   Neurological:  Negative for dizziness and headache.   Hematological:  Does not bruise/bleed easily.   Psychiatric/Behavioral:  Negative for depressed mood.          History  Social History     Socioeconomic History    Marital status:    Tobacco Use    Smoking status: Never    Smokeless tobacco: Never   Vaping Use    Vaping Use: Never used   Substance and Sexual Activity    Alcohol use: Yes     Comment: on occasion    Drug use: No     History reviewed. No pertinent past medical history.  "  Past Surgical History:   Procedure Laterality Date    ENDOSCOPY  11/2014    HERNIA REPAIR      umbilical hernia repair    TOOTH EXTRACTION  10/2020      No Known Allergies   History reviewed. No pertinent family history.    Current Outpatient Medications:     azelastine (ASTELIN) 0.1 % nasal spray, 2 sprays into the nostril(s) as directed by provider 2 (Two) Times a Day. Use in each nostril as directed, Disp: 30 mL, Rfl: 0    diphenhydrAMINE-acetaminophen (TYLENOL PM)  MG tablet per tablet, Take 1 tablet by mouth At Night As Needed for Sleep. (Patient not taking: Reported on 10/20/2023), Disp: , Rfl:                   /68 (BP Location: Left arm, Patient Position: Sitting, Cuff Size: Adult)   Pulse 72   Temp 97.1 °F (36.2 °C) (Infrared)   Resp 18   Ht 181.6 cm (71.5\")   Wt 87.9 kg (193 lb 12.8 oz)   SpO2 97%   BMI 26.65 kg/m²       Physical Exam  Vitals and nursing note reviewed.   Constitutional:       General: He is not in acute distress.     Appearance: Normal appearance. He is well-developed. He is not diaphoretic.   HENT:      Head: Normocephalic and atraumatic.      Right Ear: External ear normal.      Left Ear: External ear normal.      Nose: Nose normal.   Eyes:      General: No scleral icterus.        Right eye: No discharge.         Left eye: No discharge.      Conjunctiva/sclera: Conjunctivae normal.      Pupils: Pupils are equal, round, and reactive to light.   Neck:      Thyroid: No thyromegaly.      Vascular: No JVD (no bruits).      Trachea: No tracheal deviation.   Cardiovascular:      Rate and Rhythm: Normal rate and regular rhythm.      Heart sounds: Normal heart sounds. No murmur heard.     No friction rub. No gallop.   Pulmonary:      Effort: Pulmonary effort is normal. No respiratory distress.      Breath sounds: Normal breath sounds. No wheezing or rales.   Chest:      Chest wall: No tenderness.   Abdominal:      General: Bowel sounds are normal. There is no distension.      " Palpations: Abdomen is soft. There is no mass.      Tenderness: There is no abdominal tenderness. There is no guarding or rebound.      Hernia: No hernia is present.   Genitourinary:     Comments: deferred  Musculoskeletal:         General: No tenderness or deformity. Normal range of motion.      Cervical back: Normal range of motion and neck supple.   Lymphadenopathy:      Cervical: No cervical adenopathy.   Skin:     General: Skin is warm and dry.      Coloration: Skin is not pale.      Findings: No erythema or rash.      Comments: Approximately 3 mm raised flesh toned area consistent with cyst to the right upper inner arm and multiple nevi of varying sizes to his back. None concerning for ABCDE.   Neurological:      Mental Status: He is alert and oriented to person, place, and time.      Motor: No abnormal muscle tone.      Deep Tendon Reflexes: Reflexes are normal and symmetric. Reflexes normal.   Psychiatric:         Speech: Speech normal.         Behavior: Behavior normal.         Thought Content: Thought content normal.         Judgment: Judgment normal.                   Diagnoses and all orders for this visit:    1. Annual physical exam (Primary)  -     CBC (No Diff); Future  -     Comprehensive Metabolic Panel; Future  -     Lipid Panel; Future  -     HCV Antibody Rfx To Qnt PCR; Future    2. Multiple nevi  -     Ambulatory Referral to Dermatology    3. Encounter for hepatitis C screening test for low risk patient  -     HCV Antibody Rfx To Qnt PCR; Future    4. Dermoid cyst of right upper extremity  -     Ambulatory Referral to Dermatology      Annual exam  - Hepatitis C screening, low risk. I will update routine labs.    Multiple nevi and a cyst of the right upper extremity.  - We will refer to dermatology for good skin check.     Labs  ordered as above- will notify of results and treat accordingly. If patient has not received results within one week via mychart or letter, they will notify my  office  Immunizations and screenings:   Other preventative/screenings are up-to-date/addressed as noted in the HPI.  Counseling: The patient is advised to begin progressive daily aerobic exercise program, follow a low fat, low cholesterol diet, and return for routine annual checkups  Follow up: Return in about 1 year (around 2/20/2025) for Annual, and need to collect labs today.  Plan of care discussed with pt. They verbalized understanding and agreement.     Electronically signed by : LINDSAY Patricio    2/20/2024   10:34 EST         Transcribed from ambient dictation for LINDSAY Patricio by Anupam Rushing.  02/20/24   10:03 EST    Patient or patient representative verbalized consent to the visit recording.  I have personally performed the services described in this document as transcribed by the above individual, and it is both accurate and complete.  LINDSAY Patricio  2/20/2024  10:34 EST

## 2024-02-21 LAB
HCV AB SERPL QL IA: NORMAL
HCV IGG SERPL QL IA: NON REACTIVE

## 2024-03-05 DIAGNOSIS — R79.89 ELEVATED LFTS: ICD-10-CM

## 2024-03-05 DIAGNOSIS — R73.09 ELEVATED GLUCOSE: Primary | ICD-10-CM

## 2024-03-08 ENCOUNTER — TELEPHONE (OUTPATIENT)
Dept: INTERNAL MEDICINE | Facility: CLINIC | Age: 34
End: 2024-03-08
Payer: COMMERCIAL

## 2024-03-08 NOTE — TELEPHONE ENCOUNTER
Your labs show elevated liver tests(alt and ast), elevated glucose, elevated cholesterol,  I would recommend some additional labs to eval this. I will place the orders so that you can stop by the lab to complete these in the next 1-2 weeks.   Written by LINDSAY Adams on 3/5/2024  1:02 PM EST    LVM for pt to return call. Office number provided.

## 2024-03-08 NOTE — TELEPHONE ENCOUNTER
Pt returned call to clinic, verbalized good understanding and will stop by to have labs completed.

## 2024-03-20 ENCOUNTER — LAB (OUTPATIENT)
Dept: INTERNAL MEDICINE | Facility: CLINIC | Age: 34
End: 2024-03-20
Payer: COMMERCIAL

## 2024-03-20 DIAGNOSIS — R73.09 ELEVATED GLUCOSE: ICD-10-CM

## 2024-03-20 DIAGNOSIS — R79.89 ELEVATED LFTS: ICD-10-CM

## 2024-03-20 LAB
ALBUMIN SERPL-MCNC: 4.7 G/DL (ref 3.5–5.2)
ALP SERPL-CCNC: 92 U/L (ref 39–117)
ALT SERPL W P-5'-P-CCNC: 27 U/L (ref 1–41)
AST SERPL-CCNC: 18 U/L (ref 1–40)
BILIRUB CONJ SERPL-MCNC: <0.2 MG/DL (ref 0–0.3)
BILIRUB INDIRECT SERPL-MCNC: NORMAL MG/DL
BILIRUB SERPL-MCNC: 0.4 MG/DL (ref 0–1.2)
HBA1C MFR BLD: 5.4 % (ref 4.8–5.6)
PROT SERPL-MCNC: 7.4 G/DL (ref 6–8.5)

## 2024-03-20 PROCEDURE — 80074 ACUTE HEPATITIS PANEL: CPT | Performed by: NURSE PRACTITIONER

## 2024-03-20 PROCEDURE — 80076 HEPATIC FUNCTION PANEL: CPT | Performed by: NURSE PRACTITIONER

## 2024-03-20 PROCEDURE — 83036 HEMOGLOBIN GLYCOSYLATED A1C: CPT | Performed by: NURSE PRACTITIONER

## 2024-03-20 PROCEDURE — 36415 COLL VENOUS BLD VENIPUNCTURE: CPT | Performed by: NURSE PRACTITIONER

## 2024-03-21 ENCOUNTER — OFFICE VISIT (OUTPATIENT)
Dept: INTERNAL MEDICINE | Facility: CLINIC | Age: 34
End: 2024-03-21
Payer: COMMERCIAL

## 2024-03-21 VITALS
BODY MASS INDEX: 26.94 KG/M2 | HEIGHT: 71 IN | DIASTOLIC BLOOD PRESSURE: 64 MMHG | SYSTOLIC BLOOD PRESSURE: 110 MMHG | TEMPERATURE: 97.5 F | HEART RATE: 78 BPM | RESPIRATION RATE: 14 BRPM | WEIGHT: 192.4 LBS | OXYGEN SATURATION: 95 %

## 2024-03-21 DIAGNOSIS — H10.9 CONJUNCTIVITIS, BACTERIAL: Primary | ICD-10-CM

## 2024-03-21 LAB
HAV IGM SERPL QL IA: NORMAL
HBV CORE IGM SERPL QL IA: NORMAL
HBV SURFACE AG SERPL QL IA: NORMAL
HCV AB SER DONR QL: NORMAL

## 2024-03-21 PROCEDURE — 99213 OFFICE O/P EST LOW 20 MIN: CPT | Performed by: INTERNAL MEDICINE

## 2024-03-21 RX ORDER — CIPROFLOXACIN HYDROCHLORIDE 3.5 MG/ML
SOLUTION/ DROPS TOPICAL
Qty: 1 EACH | Refills: 0 | Status: SHIPPED | OUTPATIENT
Start: 2024-03-21

## 2024-03-21 NOTE — PROGRESS NOTES
"Chief Complaint  Eye Problem (Drainage /Red /Symptoms have been going on for a couple days)    Subjective          Ap Sotomayor presents to [facility]  History of Present Illness     L Eye drainage and redness for the last few days- initially thought it was from allergies.  He does wear contacts but has kept them out    Allergies - he uses astelin as needed, not on any AH      Current Outpatient Medications:     azelastine (ASTELIN) 0.1 % nasal spray, 2 sprays into the nostril(s) as directed by provider 2 (Two) Times a Day. Use in each nostril as directed, Disp: 30 mL, Rfl: 0    ciprofloxacin (Ciloxan) 0.3 % ophthalmic solution, 1-2 drops every 4 hrs x 5-7 days, Disp: 1 each, Rfl: 0    diphenhydrAMINE-acetaminophen (TYLENOL PM)  MG tablet per tablet, Take 1 tablet by mouth At Night As Needed for Sleep. (Patient not taking: Reported on 10/20/2023), Disp: , Rfl:    The following portions of the patient's history were reviewed and updated as appropriate: allergies, current medications, past family history, past medical history, past social history, past surgical history and problem list.     Objective   Vital Signs:   /64 (BP Location: Left arm, Patient Position: Sitting, Cuff Size: Adult)   Pulse 78   Temp 97.5 °F (36.4 °C) (Infrared)   Resp 14   Ht 181.6 cm (71.5\")   Wt 87.3 kg (192 lb 6.4 oz)   SpO2 95%   BMI 26.46 kg/m²       Physical exam  Constitutional: oriented to person, place, and time.  well-developed and well-nourished. No distress.   HENT:   Head: Normocephalic and atraumatic.   Eyes: L Conjunctivae slightly injected, watery  Neurological:  alert and oriented to person, place, and time.   Skin: Skin is warm and dry. not diaphoretic.   Psychiatric:  normal mood and affect. behavior is normal. Judgment and thought content normal.      Result Review :                   Assessment and Plan    Diagnoses and all orders for this visit:    1. Conjunctivitis, " bacterial (Primary)    Other orders  -     ciprofloxacin (Ciloxan) 0.3 % ophthalmic solution; 1-2 drops every 4 hrs x 5-7 days  Dispense: 1 each; Refill: 0    Can also do warm compresses.  Call if no better    Follow Up   No follow-ups on file.  Patient was given instructions and counseling regarding his condition or for health maintenance advice. Please see specific information pulled into the AVS if appropriate.

## 2024-04-25 ENCOUNTER — TELEPHONE (OUTPATIENT)
Dept: INTERNAL MEDICINE | Facility: CLINIC | Age: 34
End: 2024-04-25
Payer: COMMERCIAL

## 2024-04-25 NOTE — TELEPHONE ENCOUNTER
Faxed request with [facility] attached that patient is requesting to be filled out.  [facility] put in provider folder.